# Patient Record
Sex: FEMALE | Race: WHITE | NOT HISPANIC OR LATINO | Employment: FULL TIME | URBAN - METROPOLITAN AREA
[De-identification: names, ages, dates, MRNs, and addresses within clinical notes are randomized per-mention and may not be internally consistent; named-entity substitution may affect disease eponyms.]

---

## 2017-11-29 ENCOUNTER — OFFICE VISIT (OUTPATIENT)
Dept: FAMILY MEDICINE | Facility: CLINIC | Age: 27
End: 2017-11-29
Payer: COMMERCIAL

## 2017-11-29 VITALS
WEIGHT: 152 LBS | RESPIRATION RATE: 18 BRPM | OXYGEN SATURATION: 99 % | HEIGHT: 66 IN | HEART RATE: 83 BPM | DIASTOLIC BLOOD PRESSURE: 83 MMHG | TEMPERATURE: 97.6 F | BODY MASS INDEX: 24.43 KG/M2 | SYSTOLIC BLOOD PRESSURE: 127 MMHG

## 2017-11-29 DIAGNOSIS — Z30.011 ENCOUNTER FOR INITIAL PRESCRIPTION OF CONTRACEPTIVE PILLS: Primary | ICD-10-CM

## 2017-11-29 DIAGNOSIS — Z23 NEED FOR TDAP VACCINATION: ICD-10-CM

## 2017-11-29 DIAGNOSIS — D23.5 BENIGN NEOPLASM OF SKIN OF TRUNK, EXCEPT SCROTUM: ICD-10-CM

## 2017-11-29 PROCEDURE — 90471 IMMUNIZATION ADMIN: CPT | Performed by: FAMILY MEDICINE

## 2017-11-29 PROCEDURE — 99203 OFFICE O/P NEW LOW 30 MIN: CPT | Mod: 25 | Performed by: FAMILY MEDICINE

## 2017-11-29 PROCEDURE — 90715 TDAP VACCINE 7 YRS/> IM: CPT | Performed by: FAMILY MEDICINE

## 2017-11-29 RX ORDER — NORETHINDRONE ACETATE AND ETHINYL ESTRADIOL .02; 1 MG/1; MG/1
1 TABLET ORAL DAILY
Qty: 84 TABLET | Refills: 4 | Status: SHIPPED | OUTPATIENT
Start: 2017-11-29 | End: 2019-05-09

## 2017-11-29 NOTE — MR AVS SNAPSHOT
After Visit Summary   11/29/2017    Christiane Hernandez    MRN: [id number]           Patient Information     Date Of Birth          1990        Visit Information        Provider Department      11/29/2017 11:20 AM Jill Coleman MD [facility]        Today's Diagnoses     Encounter for initial prescription of contraceptive pills    -  1    Benign neoplasm of skin of trunk, except scrotum        Need for Tdap vaccination          Care Instructions      Preventive Health Recommendations  Female Ages 26 - 39  Yearly exam:   See your health care provider every year in order to    Review health changes.     Discuss preventive care.      Review your medicines if you your doctor has prescribed any.    Until age 30: Get a Pap test every three years (more often if you have had an abnormal result).    After age 30: Talk to your doctor about whether you should have a Pap test every 3 years or have a Pap test with HPV screening every 5 years.   You do not need a Pap test if your uterus was removed (hysterectomy) and you have not had cancer.  You should be tested each year for STDs (sexually transmitted diseases), if you're at risk.   Talk to your provider about how often to have your cholesterol checked.  If you are at risk for diabetes, you should have a diabetes test (fasting glucose).  Shots: Get a flu shot each year. Get a tetanus shot every 10 years.   Nutrition:     Eat at least 5 servings of fruits and vegetables each day.    Eat whole-grain bread, whole-wheat pasta and brown rice instead of white grains and rice.    Talk to your provider about Calcium and Vitamin D.     Lifestyle    Exercise at least 150 minutes a week (30 minutes a day, 5 days of the week). This will help you control your weight and prevent disease.    Limit alcohol to one drink per day.    No smoking.     Wear sunscreen to prevent skin cancer.    See your dentist every six months for an exam and cleaning.          "   Follow-ups after your visit        Who to contact     If you have questions or need follow up information about today's clinic visit or your schedule please contact [facility] directly at [phone].  Normal or non-critical lab and imaging results will be communicated to you by MyChart, letter or phone within 4 business days after the clinic has received the results. If you do not hear from us within 7 days, please contact the clinic through MyChart or phone. If you have a critical or abnormal lab result, we will notify you by phone as soon as possible.  Submit refill requests through [employer] or call your pharmacy and they will forward the refill request to us. Please allow 3 business days for your refill to be completed.          Additional Information About Your Visit        [email or web address] Information     [employer] lets you send messages to your doctor, view your test results, renew your prescriptions, schedule appointments and more. To sign up, go to [email or web address] . Click on \"Log in\" on the left side of the screen, which will take you to the Welcome page. Then click on \"Sign up Now\" on the right side of the page.     You will be asked to enter the access code listed below, as well as some personal information. Please follow the directions to create your username and password.     Your access code is: [email or web address]  Expires: 2018 12:34 PM     Your access code will  in 90 days. If you need help or a new code, please call your [facility] clinic or [phone].        Care EveryWhere ID     This is your Care EveryWhere ID. This could be used by other organizations to access your [facility] medical records  [id number]        Your Vitals Were     Pulse Temperature Respirations Height Pulse Oximetry BMI (Body Mass Index)    83 97.6  F (36.4  C) (Oral) 18 5' 6.25\" (1.683 m) 99% 24.35 kg/m2       Blood Pressure from Last 3 Encounters:   17 127/83   11 106/56   11 102/62 "    Weight from Last 3 Encounters:   11/29/17 152 lb (68.9 kg)              We Performed the Following     ADMIN 1st VACCINE     TDAP VACCINE (ADACEL)          Today's Medication Changes          These changes are accurate as of: 11/29/17 12:34 PM.  If you have any questions, ask your nurse or doctor.               These medicines have changed or have updated prescriptions.        Dose/Directions    norethindrone-ethinyl estradiol 1-20 MG-MCG per tablet   Commonly known as:  MICROGESTIN   This may have changed:    - medication strength  - how much to take  - when to take this   Used for:  Encounter for initial prescription of contraceptive pills   Changed by:  Jill Coleman MD        Dose:  1 tablet   Take 1 tablet by mouth daily   Quantity:  84 tablet   Refills:  4            Where to get your medicines      These medications were sent to [id number] - [place], [place] - [address] AT [facility] [address]  [address], [place] [address]     Phone:  [phone]     norethindrone-ethinyl estradiol 1-20 MG-MCG per tablet                Primary Care Provider Fax #    Physician No Ref-Primary [phone]       No address on file        Equal Access to Services     [facility] : Hadii ayah blunto Somarco a, waaxda luqadaha, qaybta kaalmada adeegyada, steve lockett . So [facility] [phone].    ATENCIÓN: Si habla español, tiene a newsome disposición servicios gratuitos de asistencia lingüística. Vitor al [phone].    We comply with applicable federal civil rights laws and [place] laws. We do not discriminate on the basis of race, color, national origin, age, disability, sex, sexual orientation, or gender identity.            Thank you!     Thank you for choosing [facility]  for your care. Our goal is always to provide you with excellent care. Hearing back from our patients is one way we can continue to improve our services. Please take a few  minutes to complete the written survey that you may receive in the mail after your visit with us. Thank you!             Your Updated Medication List - Protect others around you: Learn how to safely use, store and throw away your medicines at www.disposemymeds.org.          This list is accurate as of: 11/29/17 12:34 PM.  Always use your most recent med list.                   Brand Name Dispense Instructions for use Diagnosis    LEXAPRO PO      Take  by mouth.    Dysuria       norethindrone-ethinyl estradiol 1-20 MG-MCG per tablet    MICROGESTIN    84 tablet    Take 1 tablet by mouth daily    Encounter for initial prescription of contraceptive pills       predniSONE 20 MG tablet    DELTASONE    10 tablet    Take 1 tablet by mouth 2 times daily.    Tonsillitis       SEASONIQUE PO      Take  by mouth.

## 2017-11-29 NOTE — PROGRESS NOTES
SUBJECTIVE:   CC: Christiane Hernandez is an 27 year old woman who presents for preventive health visit.     Physical   Annual:     Getting at least 3 servings of Calcium per day::  Yes    Bi-annual eye exam::  Yes    Dental care twice a year::  NO    Sleep apnea or symptoms of sleep apnea::  None    Diet::  Vegetarian/vegan    Frequency of exercise::  4-5 days/week    Duration of exercise::  45-60 minutes    Taking medications regularly::  Yes    Medication side effects::  Not applicable    Additional concerns today::  No    Christiane was scheduled for a physical, but she has actually already had a complete physical with her OB/GYN provider recently, so today we will have an office visit for the followin.  OCP refill: initially started as a teen for irregular periods.  She is currently sexually active, no new partners, no symptoms of STI and has had negative STI screening that is up to date.  No plans for pregnancy in the near future.  She does have a h/o anxiety, depression and possible bipolar disorder, but is doing very well.  She does not have migraines.      2.  Skin lesion: a mole on her back, has been there all her life, is not growing or changing to her knowledge, but just likes to have it checked.  Her father has had a skin cancer that was removed; not sure if it was a melanoma.   Her mole is not pruritic or painful.     3.  Need for Tdap.  Last was .    She has a history of depression, anxiety and possible bipolar (describes a period of time when she did not need sleep).  She also has PMDD, so it helps to take her OCPs continuously.  She states she has stopped all of her medications this past year and has felt the best she has felt in years.  She is not currently in therapy but has done counseling in the past.  She states her sleep is fairly good, sometimes wakes up at 3-4am and can't get back to sleep.  Normal appetite.    She has a h/o nephrolithiasis x2, last was 2-3 years ago.  Her pap smears have  been normal, and the last was done this year.  No family h/o early CAD, but her mother has HL, and the patient states that her cholesterol was checked by her ob/gyn this year and was kind of high.    She has her degree in social work.  She is currently working at the [employer] and is considering going back to school.    Today's PHQ-2 Score:   PHQ-2 ( 1999 Pfizer) 11/29/2017   Q1: Little interest or pleasure in doing things 1   Q2: Feeling down, depressed or hopeless 1   PHQ-2 Score 2   Q1: Little interest or pleasure in doing things Several days   Q2: Feeling down, depressed or hopeless Several days   PHQ-2 Score 2       Abuse: Current or Past(Physical, Sexual or Emotional)- No  Do you feel safe in your environment - Yes    Social History   Substance Use Topics     Smoking status: Never Smoker     Smokeless tobacco: Never Used     Alcohol use Yes      Comment: occasionally      The patient does not drink >3 drinks per day nor >7 drinks per week.    Reviewed orders with patient.  Reviewed health maintenance and updated orders accordingly -   Patient Active Problem List   Diagnosis     Mood disorder (H)     Irregular menses     PMDD (premenstrual dysphoric disorder)     History reviewed. No pertinent surgical history.    Social History   Substance Use Topics     Smoking status: Never Smoker     Smokeless tobacco: Never Used     Alcohol use Yes      Comment: occasionally      Family History   Problem Relation Age of Onset     Skin Cancer Father          Current Outpatient Prescriptions   Medication Sig Dispense Refill     norethindrone-ethinyl estradiol (MICROGESTIN) 1-20 MG-MCG per tablet Take 1 tablet by mouth daily 84 tablet 4     [DISCONTINUED] Norethindrone Acet-Ethinyl Est (MICROGESTIN 1/20 PO)        Levonorgest-Eth Estrad 91-Day (SEASONIQUE PO) Take  by mouth.       predniSONE (DELTASONE) 20 MG tablet Take 1 tablet by mouth 2 times daily. (Patient not taking: Reported on 11/29/2017) 10 tablet 0      "Escitalopram Oxalate (LEXAPRO PO) Take  by mouth.       Allergies   Allergen Reactions     Zithromax [Azithromycin]      Nausea and vomiting             Pertinent mammograms are reviewed under the imaging tab.  History of abnormal Pap smear:     Reviewed and updated as needed this visit by clinical staffTobacco  Allergies  Meds  Med Hx  Surg Hx  Fam Hx  Soc Hx        Reviewed and updated as needed this visit by Provider            Review of Systems  C: NEGATIVE for fever, chills, change in weight  I: NEGATIVE for worrisome rashes, moles or lesions  E: NEGATIVE for vision changes or irritation  ENT: NEGATIVE for ear, mouth and throat problems  R: NEGATIVE for significant cough or SOB  B: NEGATIVE for masses, tenderness or discharge  CV: NEGATIVE for chest pain, palpitations or peripheral edema  GI: NEGATIVE for nausea, abdominal pain  : NEGATIVE for unusual urinary or vaginal symptoms  M: NEGATIVE for significant arthralgias or myalgia  N: NEGATIVE for weakness, dizziness or paresthesias  P: NEGATIVE for changes in mood or affect     OBJECTIVE:   /83 (BP Location: Left arm, Patient Position: Sitting, Cuff Size: Adult Regular)  Pulse 83  Temp 97.6  F (36.4  C) (Oral)  Resp 18  Ht 5' 6.25\" (1.683 m)  Wt 152 lb (68.9 kg)  SpO2 99%  BMI 24.35 kg/m2  Physical Exam  GENERAL: healthy, alert and no distress  EYES: Eyes grossly normal to inspection, PERRL and conjunctivae and sclerae normal  HENT: ear canals and TM's normal, nose and mouth without ulcers or lesions  NECK: no adenopathy, no asymmetry, masses, or scars and thyroid normal to palpation  RESP: lungs clear to auscultation - no rales, rhonchi or wheezes  BREAST: deferred, had breast exam with OB/GYN  CV: regular rate and rhythm, normal S1 S2, no S3 or S4, no murmur, click or rub, no peripheral edema and peripheral pulses strong  MS: no gross musculoskeletal defects noted, no edema  SKIN: no suspicious lesions or rashes.  There is a raised nevus " "that is mostly flesh-toned, with a small speck of medium brown in the center, the lesion is about 4-5mm, round, with regular borders and symmetry.  It is located on her left upper back.  NEURO: Normal strength and tone, mentation intact and speech normal  PSYCH: mentation appears normal, affect normal/bright    ASSESSMENT/PLAN:   1. Encounter for initial prescription of contraceptive pills    - norethindrone-ethinyl estradiol (MICROGESTIN) 1-20 MG-MCG per tablet; Take 1 tablet by mouth daily  Dispense: 84 tablet; Refill: 4    2. Benign neoplasm of skin of trunk, except scrotum  Reassurance, benign appearance.  Monitor.    3. Need for Tdap vaccination    - TDAP VACCINE (ADACEL)  - ADMIN 1st VACCINE               reports that she has never smoked. She has never used smokeless tobacco.    Estimated body mass index is 24.35 kg/(m^2) as calculated from the following:    Height as of this encounter: 5' 6.25\" (1.683 m).    Weight as of this encounter: 152 lb (68.9 kg).       Counseling Resources:  ATP IV Guidelines  Pooled Cohorts Equation Calculator  Breast Cancer Risk Calculator  FRAX Risk Assessment  ICSI Preventive Guidelines  Dietary Guidelines for Americans, 2010  Wanova's MyPlate  ASA Prophylaxis  Lung CA Screening    Jill Coleman MD  [facility]  Answers for HPI/ROS submitted by the patient on 11/29/2017   PHQ-2 Score: 2    "

## 2017-11-29 NOTE — NURSING NOTE

## 2019-03-29 ENCOUNTER — VIRTUAL VISIT (OUTPATIENT)
Dept: FAMILY MEDICINE | Facility: OTHER | Age: 29
End: 2019-03-29

## 2019-03-29 NOTE — PROGRESS NOTES
"Date:   Clinician: Ck Merino  Clinician NPI: [id number]  Patient: Christiane Hernandez  Patient : 1990  Patient Address: [address] [place] [id number]  Patient Phone: [phone]  Visit Protocol: UTI  Patient Summary:  Christiane is a 28 year old ( : 1990 ) female who initiated a Visit for a presumed bladder infection. When asked the question \"Please sign me up to receive news, health information and promotions from [employer].\", Christiane responded \"No\".   Her symptoms started today and consist of feeling as if the bladder is never empty, urgency, foul-smelling urine, urinary incontinence, and urinary frequency.   Symptom details   Urine color: The color of her urine is yellow.    Denied symptoms include vomiting, abdominal pain, chills, vaginal itching, nausea, flank pain, vaginal discharge, and dysuria. She does not feel feverish.   Christiane has not used any over-the-counter medications or home remedies to relieve her current symptoms.  Precipitating events  Christiane denies having a sexually transmitted disease.  Pertinent medical history  Christiane has had a bladder infection before but has not had any in the past 12 months. Her current symptoms are similar to her previous bladder infection symptoms.   She is not sure what antibiotics have been effective in treating her past bladder infections.   She has a history of kidney stones. Her last episode was more than 6 months ago.   Christiane does not get yeast infections when she takes antibiotics and has not been prescribed antibiotics to prevent frequent or repeated bladder infections in the past. She has not experienced problems or side effects with any of the common antibiotics used to treat bladder infections.   She has not used a catheter or been a patient in a hospital or nursing home in the past 2 weeks.   Christiane does not smoke or use smokeless tobacco.   She denies pregnancy and denies breastfeeding. She is currently menstruating.   MEDICATIONS: No " current medications, ALLERGIES: NKDA  Clinician Response:  Dear Christiane,  Based on the information you have provided, you likely have an acute urinary tract infection, also called a bladder infection. Bladder infections occur when bacteria from the outside of the body enters the urinary tract. Any part of the urinary system can be infected, but the bladder is the most common.  Medication information  I am prescribing:     Nitrofurantoin monohyd/m-cryst (Macrobid) 100 mg oral capsule. Take 1 capsule by mouth every 12 hours for 5 days. Take this medication with food. There are no refills with this prescription.   The medication I prescribed for your bladder infection is an antibiotic. Continue taking the medication until it is gone even if you feel better.   Yeast infections can be a common side effect of antibiotics. The most common symptom of a yeast infection is itchiness in and around the vagina. Other signs and symptoms include burning, redness, or a thick, white vaginal discharge that looks like cottage cheese and does not have a bad smell.  Self care  Urination helps to flush bacteria from the urinary tract. For this reason, drinking water and urinating often helps relieve some symptoms of a bladder infection and can decrease your risk of getting bladder infections in the future.  Other steps you can take to prevent future bladder infections include:     Wipe front to back after using the bathroom    Urinate after sexual intercourse    Avoid using deodorant sprays, douches, or powders in the vaginal area     When to seek care  Please make an appointment to be seen in a clinic or urgent care if any of the following occur:     You develop new symptoms or your symptoms become worse    You have medication side effects that make it difficult to take them as prescribed    Your symptoms do not improve within 1-2 days of starting treatment    You have symptoms of a bladder infection that return shortly after completing  treatment     It is possible to have an allergic reaction to an antibiotic even if you have not had one in the past. If you notice a new rash, significant swelling, or difficulty breathing, stop taking this medication immediately and go to a clinic or urgent care.   Diagnosis: Acute uncomplicated bladder infection  Diagnosis ICD: N39.0  Prescription: nitrofurantoin monohyd/m-cryst (Macrobid) 100 mg oral capsule 10 capsule, 5 days supply. Take 1 capsule by mouth every 12 hours for 5 days. Refills: 0, Refill as needed: no, Allow substitutions: yes  Pharmacy: [id number] - [phone] - [address], [place] [address]

## 2019-05-03 DIAGNOSIS — Z30.011 ENCOUNTER FOR INITIAL PRESCRIPTION OF CONTRACEPTIVE PILLS: ICD-10-CM

## 2019-05-03 NOTE — LETTER
[facility]  [address] [address]  Phone: [phone]    05/06/19    Christiane David  [address]  [address]  [facility] [address]      To whom it may concern:     In order to ensure we are providing the best quality care, we have reviewed your chart and see that you are due for a follow up appointment with your healthcare provider.    For future medication refills, please contact your primary care clinic to schedule a follow up appointment. This can be requested via Stat or by calling the clinic at [phone].    We greatly appreciate the opportunity to serve you. Thank you for trusting us with your health care.      Sincerely,      Your care team at [facility]

## 2019-05-03 NOTE — TELEPHONE ENCOUNTER
"Requested Prescriptions   Pending Prescriptions Disp Refills     norethindrone-ethinyl estradiol (MICROGESTIN 1/20) 1-20 MG-MCG tablet [Pharmacy Med Name: NORETHINDRONE ACET/ETH 1/20 TB 21'S]  Last Written Prescription Date:  11-29-17  Last Fill Quantity: 84 tab,  # refills: 4   Last office visit: 11/29/2017 with prescribing provider:  Jill Coleman    Future Office Visit:    84 tablet 0     Sig: TAKE 1 TABLET BY MOUTH DAILY       Contraceptives Protocol Failed - 5/3/2019  8:48 AM        Failed - Recent (12 mo) or future (30 days) visit within the authorizing provider's specialty     Patient had office visit in the last 12 months or has a visit in the next 30 days with authorizing provider or within the authorizing provider's specialty.  See \"Patient Info\" tab in inbasket, or \"Choose Columns\" in Meds & Orders section of the refill encounter.          Passed - Patient is not a current smoker if age is 35 or older        Passed - Medication is active on med list        Passed - No active pregnancy on record        Passed - No positive pregnancy test in past 12 months         "

## 2019-05-03 NOTE — TELEPHONE ENCOUNTER
Routing refill request to provider for review/approval because:  Patient needs to be seen because it has been more than 1 year since last office visit.  Break in medication      Routing to [facility] Reception - please advise patient office visit

## 2019-05-06 RX ORDER — NORETHINDRONE ACETATE AND ETHINYL ESTRADIOL .02; 1 MG/1; MG/1
1 TABLET ORAL DAILY
Qty: 84 TABLET | Refills: 0 | OUTPATIENT
Start: 2019-05-06

## 2019-05-06 NOTE — TELEPHONE ENCOUNTER
LM to return clinic phone call. Patient is due for an office visit.   Sending letter.       Breonna JOHANSEN     [facility]

## 2019-05-09 ENCOUNTER — OFFICE VISIT (OUTPATIENT)
Dept: FAMILY MEDICINE | Facility: CLINIC | Age: 29
End: 2019-05-09
Payer: COMMERCIAL

## 2019-05-09 VITALS
OXYGEN SATURATION: 98 % | WEIGHT: 134.4 LBS | DIASTOLIC BLOOD PRESSURE: 68 MMHG | RESPIRATION RATE: 18 BRPM | BODY MASS INDEX: 21.09 KG/M2 | TEMPERATURE: 97.6 F | HEIGHT: 67 IN | SYSTOLIC BLOOD PRESSURE: 100 MMHG | HEART RATE: 75 BPM

## 2019-05-09 DIAGNOSIS — B00.1 COLD SORE: ICD-10-CM

## 2019-05-09 DIAGNOSIS — Z00.00 ENCOUNTER FOR ROUTINE ADULT HEALTH EXAMINATION WITHOUT ABNORMAL FINDINGS: Primary | ICD-10-CM

## 2019-05-09 DIAGNOSIS — Z30.011 ENCOUNTER FOR INITIAL PRESCRIPTION OF CONTRACEPTIVE PILLS: ICD-10-CM

## 2019-05-09 PROCEDURE — G0145 SCR C/V CYTO,THINLAYER,RESCR: HCPCS | Performed by: NURSE PRACTITIONER

## 2019-05-09 PROCEDURE — 99395 PREV VISIT EST AGE 18-39: CPT | Performed by: NURSE PRACTITIONER

## 2019-05-09 RX ORDER — VALACYCLOVIR HYDROCHLORIDE 1 G/1
2000 TABLET, FILM COATED ORAL 2 TIMES DAILY
Qty: 4 TABLET | Status: SHIPPED | OUTPATIENT
Start: 2019-05-09 | End: 2020-08-25

## 2019-05-09 RX ORDER — NORETHINDRONE ACETATE AND ETHINYL ESTRADIOL .02; 1 MG/1; MG/1
1 TABLET ORAL DAILY
Qty: 84 TABLET | Refills: 4 | Status: SHIPPED | OUTPATIENT
Start: 2019-05-09 | End: 2020-05-14

## 2019-05-09 ASSESSMENT — PATIENT HEALTH QUESTIONNAIRE - PHQ9: SUM OF ALL RESPONSES TO PHQ QUESTIONS 1-9: 2

## 2019-05-09 ASSESSMENT — MIFFLIN-ST. JEOR: SCORE: 1369.32

## 2019-05-09 NOTE — PROGRESS NOTES
SUBJECTIVE:   CC: Christiane Hernandez is an 28 year old woman who presents for preventive health visit.     Healthy Habits:    Do you get at least three servings of calcium containing foods daily (dairy, green leafy vegetables, etc.)? yes    Amount of exercise or daily activities, outside of work: 5 day(s) per week    Problems taking medications regularly No    Medication side effects: No    Have you had an eye exam in the past two years? yes    Do you see a dentist twice per year? no    Do you have sleep apnea, excessive snoring or daytime drowsiness?no    Pap smear done on this date: 11- (approximately), by this group: [facility], [place], [place], results were Normal.             Today's PHQ-2 Score:   PHQ-2 ( 1999 [employer]) 5/9/2019 11/29/2017   Q1: Little interest or pleasure in doing things 1 1   Q2: Feeling down, depressed or hopeless 1 1   PHQ-2 Score 2 2   Q1: Little interest or pleasure in doing things - Several days   Q2: Feeling down, depressed or hopeless - Several days   PHQ-2 Score - 2       Abuse: Current or Past(Physical, Sexual or Emotional)- No  Do you feel safe in your environment? Yes    Social History     Tobacco Use     Smoking status: Never Smoker     Smokeless tobacco: Never Used   Substance Use Topics     Alcohol use: Yes     Comment: occasionally      If you drink alcohol do you typically have >3 drinks per day or >7 drinks per week? No                     Reviewed orders with patient.  Reviewed health maintenance and updated orders accordingly - Yes          Pertinent mammograms are reviewed under the imaging tab.  History of abnormal Pap smear: NO - age 21-29 PAP every 3 years recommended     Reviewed and updated as needed this visit by clinical staff  Tobacco  Allergies  Meds  Med Hx  Surg Hx  Fam Hx  Soc Hx        Reviewed and updated as needed this visit by Provider            ROS:  CONSTITUTIONAL: NEGATIVE for fever, chills, change in weight  INTEGUMENTARU/SKIN: NEGATIVE for  "worrisome rashes, moles or lesions  EYES: NEGATIVE for vision changes or irritation  ENT: NEGATIVE for ear, mouth and throat problems  RESP: NEGATIVE for significant cough or SOB  BREAST: NEGATIVE for masses, tenderness or discharge  CV: NEGATIVE for chest pain, palpitations or peripheral edema  GI: NEGATIVE for nausea, abdominal pain, heartburn, or change in bowel habits  : NEGATIVE for unusual urinary or vaginal symptoms. Periods are regular.  MUSCULOSKELETAL: NEGATIVE for significant arthralgias or myalgia  NEURO: NEGATIVE for weakness, dizziness or paresthesias  PSYCHIATRIC: NEGATIVE for changes in mood or affect    OBJECTIVE:   /68 (BP Location: Left arm, Patient Position: Sitting, Cuff Size: Adult Regular)   Pulse 75   Temp 97.6  F (36.4  C) (Oral)   Resp 18   Ht 1.689 m (5' 6.5\")   Wt 61 kg (134 lb 6.4 oz)   LMP 05/01/2019 (Approximate)   SpO2 98%   Breastfeeding? No   BMI 21.37 kg/m    EXAM:  GENERAL: healthy, alert and no distress  EYES: Eyes grossly normal to inspection, PERRL and conjunctivae and sclerae normal  HENT: ear canals and TM's normal, nose and mouth without ulcers or lesions  NECK: no adenopathy, no asymmetry, masses, or scars and thyroid normal to palpation  RESP: lungs clear to auscultation - no rales, rhonchi or wheezes  BREAST: normal without masses, tenderness or nipple discharge and no palpable axillary masses or adenopathy  CV: regular rate and rhythm, normal S1 S2, no S3 or S4, no murmur, click or rub, no peripheral edema and peripheral pulses strong  ABDOMEN: soft, nontender, no hepatosplenomegaly, no masses and bowel sounds normal   (female): normal female external genitalia, normal urethral meatus, vaginal mucosa pink, moist, well rugated, and normal cervix/adnexa/uterus without masses or discharge  MS: no gross musculoskeletal defects noted, no edema  SKIN: no suspicious lesions or rashes  NEURO: Normal strength and tone, mentation intact and speech normal  PSYCH: " "mentation appears normal, affect normal/bright        ASSESSMENT/PLAN:   1. Encounter for routine adult health examination without abnormal findings    - Pap imaged thin layer screen reflex to HPV if ASCUS - recommend age 25 - 29    2. Encounter for initial prescription of contraceptive pills  Refills given.   - norethindrone-ethinyl estradiol (MICROGESTIN) 1-20 MG-MCG tablet; Take 1 tablet by mouth daily  Dispense: 84 tablet; Refill: 4    3. Cold sore    - valACYclovir (VALTREX) 1000 mg tablet; Take 2 tablets (2,000 mg) by mouth 2 times daily for 1 day  Dispense: 4 tablet; Refill: PRN    COUNSELING:   Reviewed preventive health counseling, as reflected in patient instructions    BP Readings from Last 1 Encounters:   05/09/19 100/68     Estimated body mass index is 21.37 kg/m  as calculated from the following:    Height as of this encounter: 1.689 m (5' 6.5\").    Weight as of this encounter: 61 kg (134 lb 6.4 oz).           reports that she has never smoked. She has never used smokeless tobacco.      Counseling Resources:  ATP IV Guidelines  Pooled Cohorts Equation Calculator  Breast Cancer Risk Calculator  FRAX Risk Assessment  ICSI Preventive Guidelines  Dietary Guidelines for Americans, 2010  USDA's MyPlate  ASA Prophylaxis  Lung CA Screening    Eliane Shaver NP  [facility]  "

## 2019-05-09 NOTE — Clinical Note
Pap smear done on this date: 11- (approximately), by this group: [facility], [facility], [place], results were Normal.  Please abstract the following data from this visit with this patient into the appropriate field in Epic:COLE was completed and records are coming from last clinic

## 2019-05-13 LAB
COPATH REPORT: NORMAL
PAP: NORMAL

## 2020-02-13 ENCOUNTER — OFFICE VISIT (OUTPATIENT)
Dept: FAMILY MEDICINE | Facility: CLINIC | Age: 30
End: 2020-02-13
Payer: COMMERCIAL

## 2020-02-13 VITALS
TEMPERATURE: 98.4 F | HEART RATE: 72 BPM | RESPIRATION RATE: 16 BRPM | DIASTOLIC BLOOD PRESSURE: 66 MMHG | WEIGHT: 145 LBS | SYSTOLIC BLOOD PRESSURE: 124 MMHG | BODY MASS INDEX: 23.05 KG/M2

## 2020-02-13 DIAGNOSIS — R07.0 THROAT PAIN: Primary | ICD-10-CM

## 2020-02-13 LAB
DEPRECATED S PYO AG THROAT QL EIA: NORMAL
SPECIMEN SOURCE: NORMAL

## 2020-02-13 PROCEDURE — 87880 STREP A ASSAY W/OPTIC: CPT | Performed by: FAMILY MEDICINE

## 2020-02-13 PROCEDURE — 99213 OFFICE O/P EST LOW 20 MIN: CPT | Performed by: FAMILY MEDICINE

## 2020-02-13 PROCEDURE — 87081 CULTURE SCREEN ONLY: CPT | Performed by: FAMILY MEDICINE

## 2020-02-13 ASSESSMENT — PAIN SCALES - GENERAL: PAINLEVEL: EXTREME PAIN (8)

## 2020-02-13 NOTE — PROGRESS NOTES
Subjective         HPI   Presents with one day history of cough, bodyaches, chills, HA and RIGHT ear pain and worsening ST today.  Works as RN- multiple sick exposures.  No fever.      Patient Active Problem List   Diagnosis     Irregular menses     PMDD (premenstrual dysphoric disorder)     History reviewed. No pertinent surgical history.    Social History     Tobacco Use     Smoking status: Never Smoker     Smokeless tobacco: Never Used   Substance Use Topics     Alcohol use: Yes     Comment: occasionally      Family History   Problem Relation Age of Onset     Skin Cancer Father          Current Outpatient Medications   Medication Sig Dispense Refill     norethindrone-ethinyl estradiol (MICROGESTIN) 1-20 MG-MCG tablet Take 1 tablet by mouth daily 84 tablet 4     valACYclovir (VALTREX) 1000 mg tablet Take 2 tablets (2,000 mg) by mouth 2 times daily for 1 day 4 tablet PRN     Allergies   Allergen Reactions     Zithromax [Azithromycin]      Nausea and vomiting     Recent Labs   Lab Test 11/15/16   A1C 5.2      HDL 42*   TRIG 114   ALT 12   CR 0.76   GFRESTIMATED 109   GFRESTBLACK 126   POTASSIUM 4.0   TSH 0.76      BP Readings from Last 3 Encounters:   02/13/20 124/66   05/09/19 100/68   11/29/17 127/83    Wt Readings from Last 3 Encounters:   02/13/20 65.8 kg (145 lb)   05/09/19 61 kg (134 lb 6.4 oz)   11/29/17 68.9 kg (152 lb)                    Reviewed and updated as needed this visit by Provider         Review of Systems   ROS COMP: Constitutional, HEENT, cardiovascular, pulmonary, GI, , musculoskeletal, neuro, skin, endocrine and psych systems are negative, except as otherwise noted.      Objective    /66   Pulse 72   Temp 98.4  F (36.9  C) (Tympanic)   Resp 16   Wt 65.8 kg (145 lb)   Breastfeeding No   BMI 23.05 kg/m      Physical Exam   GENERAL: healthy, alert and no distress  EYES: Eyes grossly normal to inspection, PERRL and conjunctivae and sclerae normal  HENT: ear canals and TM's  normal, nose and mouth without ulcers or lesions  NECK: no adenopathy, no asymmetry, masses, or scars and thyroid normal to palpation  RESP: lungs clear to auscultation - no rales, rhonchi or wheezes  CV: regular rate and rhythm, normal S1 S2, no S3 or S4, no murmur, click or rub, no peripheral edema and peripheral pulses strong  ABDOMEN: soft, nontender, no hepatosplenomegaly, no masses and bowel sounds normal  MS: no gross musculoskeletal defects noted, no edema  SKIN: no suspicious lesions or rashes  NEURO: Normal strength and tone, mentation intact and speech normal  PSYCH: mentation appears normal, affect normal/bright    Diagnostic Test Results:  Labs reviewed in Epic  Results for orders placed or performed in visit on 02/13/20   Strep, Rapid Screen     Status: None   Result Value Ref Range    Specimen Description Throat     Rapid Strep A Screen       NEGATIVE: No Group A streptococcal antigen detected by immunoassay, await culture report.   Beta strep group A culture     Status: None   Result Value Ref Range    Specimen Description Throat     Culture Micro No beta hemolytic Streptococcus Group A isolated            Assessment & Plan     1. Throat pain    - Strep, Rapid Screen  - Beta strep group A culture     Reassured RST is negative.  Might be early influenza-- recommend close Follow-up if no change or worsening symptoms prn.  Continue with supportive cares, including rest, fluids, NSAIDs/Tylenol prn fever/comfort.    Geraldine Cannon MD  [facility]

## 2020-02-14 LAB
BACTERIA SPEC CULT: NORMAL
SPECIMEN SOURCE: NORMAL

## 2020-03-01 ENCOUNTER — HEALTH MAINTENANCE LETTER (OUTPATIENT)
Age: 30
End: 2020-03-01

## 2020-05-14 DIAGNOSIS — Z30.011 ENCOUNTER FOR INITIAL PRESCRIPTION OF CONTRACEPTIVE PILLS: ICD-10-CM

## 2020-05-14 RX ORDER — NORETHINDRONE ACETATE AND ETHINYL ESTRADIOL .02; 1 MG/1; MG/1
TABLET ORAL
Qty: 84 TABLET | Refills: 1 | Status: SHIPPED | OUTPATIENT
Start: 2020-05-14 | End: 2020-08-25

## 2020-08-25 ENCOUNTER — OFFICE VISIT (OUTPATIENT)
Dept: FAMILY MEDICINE | Facility: CLINIC | Age: 30
End: 2020-08-25
Payer: COMMERCIAL

## 2020-08-25 VITALS
WEIGHT: 142 LBS | DIASTOLIC BLOOD PRESSURE: 78 MMHG | HEART RATE: 64 BPM | HEIGHT: 67 IN | TEMPERATURE: 98.4 F | RESPIRATION RATE: 16 BRPM | BODY MASS INDEX: 22.29 KG/M2 | OXYGEN SATURATION: 100 % | SYSTOLIC BLOOD PRESSURE: 112 MMHG

## 2020-08-25 DIAGNOSIS — Z00.00 ROUTINE GENERAL MEDICAL EXAMINATION AT A HEALTH CARE FACILITY: Primary | ICD-10-CM

## 2020-08-25 DIAGNOSIS — B00.1 COLD SORE: ICD-10-CM

## 2020-08-25 DIAGNOSIS — F41.9 ANXIETY: ICD-10-CM

## 2020-08-25 DIAGNOSIS — Z30.41 ENCOUNTER FOR SURVEILLANCE OF CONTRACEPTIVE PILLS: ICD-10-CM

## 2020-08-25 PROCEDURE — 99395 PREV VISIT EST AGE 18-39: CPT | Performed by: NURSE PRACTITIONER

## 2020-08-25 PROCEDURE — 99213 OFFICE O/P EST LOW 20 MIN: CPT | Mod: 25 | Performed by: NURSE PRACTITIONER

## 2020-08-25 RX ORDER — VALACYCLOVIR HYDROCHLORIDE 1 G/1
2000 TABLET, FILM COATED ORAL 2 TIMES DAILY
Qty: 4 TABLET | Status: SHIPPED | OUTPATIENT
Start: 2020-08-25 | End: 2022-04-12

## 2020-08-25 RX ORDER — NORETHINDRONE ACETATE AND ETHINYL ESTRADIOL .02; 1 MG/1; MG/1
1 TABLET ORAL DAILY
Qty: 112 TABLET | Status: SHIPPED | OUTPATIENT
Start: 2020-08-25 | End: 2021-08-29

## 2020-08-25 ASSESSMENT — ANXIETY QUESTIONNAIRES
2. NOT BEING ABLE TO STOP OR CONTROL WORRYING: MORE THAN HALF THE DAYS
1. FEELING NERVOUS, ANXIOUS, OR ON EDGE: NEARLY EVERY DAY
IF YOU CHECKED OFF ANY PROBLEMS ON THIS QUESTIONNAIRE, HOW DIFFICULT HAVE THESE PROBLEMS MADE IT FOR YOU TO DO YOUR WORK, TAKE CARE OF THINGS AT HOME, OR GET ALONG WITH OTHER PEOPLE: VERY DIFFICULT
6. BECOMING EASILY ANNOYED OR IRRITABLE: NEARLY EVERY DAY
GAD7 TOTAL SCORE: 14
7. FEELING AFRAID AS IF SOMETHING AWFUL MIGHT HAPPEN: SEVERAL DAYS
3. WORRYING TOO MUCH ABOUT DIFFERENT THINGS: MORE THAN HALF THE DAYS
5. BEING SO RESTLESS THAT IT IS HARD TO SIT STILL: SEVERAL DAYS

## 2020-08-25 ASSESSMENT — PATIENT HEALTH QUESTIONNAIRE - PHQ9
SUM OF ALL RESPONSES TO PHQ QUESTIONS 1-9: 7
5. POOR APPETITE OR OVEREATING: MORE THAN HALF THE DAYS

## 2020-08-25 ASSESSMENT — MIFFLIN-ST. JEOR: SCORE: 1398.8

## 2020-08-25 NOTE — PROGRESS NOTES
SUBJECTIVE:   CC: Christiane Hernandez is an 29 year old woman who presents for preventive health visit.     Healthy Habits:    Do you get at least three servings of calcium containing foods daily (dairy, green leafy vegetables, etc.)? Probably 3      Amount of exercise or daily activities, outside of work: 6 day(s) per week    Problems taking medications regularly No    Medication side effects: No    Have you had an eye exam in the past two years? no    Do you see a dentist twice per year? no    Do you have sleep apnea, excessive snoring or daytime drowsiness?no          Today's PHQ-2 Score:   PHQ-2 ( 1999 Pfizer) 5/9/2019 11/29/2017   Q1: Little interest or pleasure in doing things 1 1   Q2: Feeling down, depressed or hopeless 1 1   PHQ-2 Score 2 2   Q1: Little interest or pleasure in doing things - Several days   Q2: Feeling down, depressed or hopeless - Several days   PHQ-2 Score - 2       Abuse: Current or Past(Physical, Sexual or Emotional)- No  Do you feel safe in your environment? YES    Has been on antidepressant and anxiety medications in the past  Discuss things to help with this. Homeopathic options?   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  She has been on Vibriid, Lexapro and other SSRIs as well as a mood stabilizer in the past.  She either felt numb or had side effects.    Her anxiety typically presents as physical symptoms.      Social History     Tobacco Use     Smoking status: Never Smoker     Smokeless tobacco: Never Used   Substance Use Topics     Alcohol use: Yes     Comment: occasionally. less than 5 a week      If you drink alcohol do you typically have >3 drinks per day or >7 drinks per week? No                     Reviewed orders with patient.  Reviewed health maintenance and updated orders accordingly - Yes          Pertinent  "mammograms are reviewed under the imaging tab.  History of abnormal Pap smear: NO - age 21-29 PAP every 3 years recommended  PAP / HPV 5/9/2019   PAP NIL     Reviewed and updated as needed this visit by clinical staff  Tobacco  Allergies  Meds  Med Hx  Surg Hx  Fam Hx  Soc Hx        Reviewed and updated as needed this visit by Provider            ROS:  CONSTITUTIONAL: NEGATIVE for fever, chills, change in weight  INTEGUMENTARU/SKIN: NEGATIVE for worrisome rashes, moles or lesions  EYES: NEGATIVE for vision changes or irritation  ENT: NEGATIVE for ear, mouth and throat problems  RESP: NEGATIVE for significant cough or SOB  BREAST: NEGATIVE for masses, tenderness or discharge  CV: NEGATIVE for chest pain, palpitations or peripheral edema  GI: NEGATIVE for nausea, abdominal pain, heartburn, or change in bowel habits  : NEGATIVE for unusual urinary or vaginal symptoms. Periods are regular.  MUSCULOSKELETAL: NEGATIVE for significant arthralgias or myalgia  NEURO: NEGATIVE for weakness, dizziness or paresthesias  PSYCHIATRIC: see HPI    OBJECTIVE:   /78   Pulse 64   Temp 98.4  F (36.9  C) (Oral)   Resp 16   Ht 1.689 m (5' 6.5\")   Wt 64.4 kg (142 lb)   SpO2 100%   BMI 22.58 kg/m    EXAM:  GENERAL: healthy, alert and no distress  EYES: Eyes grossly normal to inspection, PERRL and conjunctivae and sclerae normal  HENT: ear canals and TM's normal, nose and mouth without ulcers or lesions  NECK: no adenopathy, no asymmetry, masses, or scars and thyroid normal to palpation  RESP: lungs clear to auscultation - no rales, rhonchi or wheezes  BREAST: normal without masses, tenderness or nipple discharge and no palpable axillary masses or adenopathy  CV: regular rate and rhythm, normal S1 S2, no S3 or S4, no murmur, click or rub, no peripheral edema and peripheral pulses strong  ABDOMEN: soft, nontender, no hepatosplenomegaly, no masses and bowel sounds normal  MS: no gross musculoskeletal defects noted, no " "edema  SKIN: no suspicious lesions or rashes  NEURO: Normal strength and tone, mentation intact and speech normal  PSYCH: mentation appears normal, affect normal        ASSESSMENT/PLAN:   (Z00.00) Routine general medical examination at a health care facility  (primary encounter diagnosis)  Comment:   Plan:     (F41.9) Anxiety  Comment:   Plan: Discussed her anxiety at length, including non-pharmacological treatments.  She will continue to see her therapist.  We did discuss the option of trying Buspar.  She prefers to wait and see how things go with starting school.  If her anxiety is not manageable, she will follow up for a virtual visit to discuss medication options in greater detail.     (B00.1) Cold sore  Comment:   Plan: valACYclovir (VALTREX) 1000 mg tablet        Refills given.     (Z30.41) Encounter for surveillance of contraceptive pills  Comment:   Plan: norethindrone-ethinyl estradiol (MICROGESTIN         1/20) 1-20 MG-MCG tablet        Refills given.       COUNSELING:   Reviewed preventive health counseling, as reflected in patient instructions    Estimated body mass index is 22.58 kg/m  as calculated from the following:    Height as of this encounter: 1.689 m (5' 6.5\").    Weight as of this encounter: 64.4 kg (142 lb).         reports that she has never smoked. She has never used smokeless tobacco.      Counseling Resources:  ATP IV Guidelines  Pooled Cohorts Equation Calculator  Breast Cancer Risk Calculator  FRAX Risk Assessment  ICSI Preventive Guidelines  Dietary Guidelines for Americans, 2010  USDA's MyPlate  ASA Prophylaxis  Lung CA Screening    Eliane Shaver NP  [facility]  "

## 2020-08-26 ASSESSMENT — ANXIETY QUESTIONNAIRES: GAD7 TOTAL SCORE: 14

## 2020-12-14 ENCOUNTER — HEALTH MAINTENANCE LETTER (OUTPATIENT)
Age: 30
End: 2020-12-14

## 2021-02-08 ENCOUNTER — OFFICE VISIT (OUTPATIENT)
Dept: FAMILY MEDICINE | Facility: CLINIC | Age: 31
End: 2021-02-08
Payer: COMMERCIAL

## 2021-02-08 VITALS
OXYGEN SATURATION: 100 % | HEART RATE: 64 BPM | BODY MASS INDEX: 24.01 KG/M2 | WEIGHT: 151 LBS | DIASTOLIC BLOOD PRESSURE: 75 MMHG | SYSTOLIC BLOOD PRESSURE: 120 MMHG | TEMPERATURE: 99 F | RESPIRATION RATE: 18 BRPM

## 2021-02-08 DIAGNOSIS — N20.0 KIDNEY STONE: ICD-10-CM

## 2021-02-08 DIAGNOSIS — R39.9 SYMPTOMS INVOLVING URINARY SYSTEM: Primary | ICD-10-CM

## 2021-02-08 LAB
ALBUMIN UR-MCNC: ABNORMAL MG/DL
APPEARANCE UR: CLEAR
BACTERIA #/AREA URNS HPF: ABNORMAL /HPF
BILIRUB UR QL STRIP: NEGATIVE
COLOR UR AUTO: YELLOW
GLUCOSE UR STRIP-MCNC: NEGATIVE MG/DL
HGB UR QL STRIP: NEGATIVE
KETONES UR STRIP-MCNC: NEGATIVE MG/DL
LEUKOCYTE ESTERASE UR QL STRIP: NEGATIVE
NITRATE UR QL: NEGATIVE
NON-SQ EPI CELLS #/AREA URNS LPF: ABNORMAL /LPF
PH UR STRIP: 7.5 PH (ref 5–7)
RBC #/AREA URNS AUTO: ABNORMAL /HPF
SOURCE: ABNORMAL
SP GR UR STRIP: 1.02 (ref 1–1.03)
UROBILINOGEN UR STRIP-ACNC: 0.2 EU/DL (ref 0.2–1)
WBC #/AREA URNS AUTO: ABNORMAL /HPF

## 2021-02-08 PROCEDURE — 87086 URINE CULTURE/COLONY COUNT: CPT | Performed by: NURSE PRACTITIONER

## 2021-02-08 PROCEDURE — 81001 URINALYSIS AUTO W/SCOPE: CPT | Performed by: NURSE PRACTITIONER

## 2021-02-08 PROCEDURE — 99213 OFFICE O/P EST LOW 20 MIN: CPT | Performed by: NURSE PRACTITIONER

## 2021-02-08 RX ORDER — SULFAMETHOXAZOLE/TRIMETHOPRIM 800-160 MG
1 TABLET ORAL 2 TIMES DAILY
Qty: 6 TABLET | Refills: 0 | Status: SHIPPED | OUTPATIENT
Start: 2021-02-08 | End: 2021-02-11

## 2021-02-08 RX ORDER — TAMSULOSIN HYDROCHLORIDE 0.4 MG/1
0.4 CAPSULE ORAL DAILY
Qty: 14 CAPSULE | Status: SHIPPED | OUTPATIENT
Start: 2021-02-08 | End: 2021-02-22

## 2021-02-08 NOTE — PROGRESS NOTES
"  Assessment & Plan     Symptoms involving urinary system  UA appears negative, but will treat with a 3 day course of Bactrim for possible UTI based on symptoms.  Try Azo, increase fluids.  Discussed getting a strainer to strain urine in future, may also take Flomax in case of symptoms of a renal stone in future.   - UA with Microscopic reflex to Culture  - Urine Culture Aerobic Bacterial  - sulfamethoxazole-trimethoprim (BACTRIM DS) 800-160 MG tablet; Take 1 tablet by mouth 2 times daily for 3 days    Follow up if she develops pain, vomiting, fevers or symptoms are not improving over the next few days.     Kidney stone  See above.   - tamsulosin (FLOMAX) 0.4 MG capsule; Take 1 capsule (0.4 mg) by mouth daily for 14 days      22 minutes spent on the date of the encounter doing chart review, patient visit and documentation                No follow-ups on file.    Eliane Shaver NP  [facility]    Herberth Grande is a 30 year old who presents to clinic today for the following health issues   HPI       Genitourinary - Female  Passed kidney stone Thurs AM (5 days ago)-concerned about UTI  Onset/Duration: In the past 2 days dysuria started   Description:   Painful urination (Dysuria): YES- burning.            Frequency: YES  Blood in urine (Hematuria): no  Delay in urine (Hesitency): YES: sometimes   Progression of Symptoms:  Worsening over the past 2 days.  Accompanying Signs & Symptoms:  Fever/chills: no  Flank pain: YES cramping on left side  Nausea and vomiting: YES- nausea   Vaginal symptoms: a little more  Odor than normal.   Abdominal/Pelvic Pain: cramping on left side at the end of urine flow   History:   History of frequent UTI s: YES  History of kidney stones: YES  Sexually Active: YES  Possibility of pregnancy: No  Therapies tried and outcome: ibuprofen 4 tablets every 6 hours: helped somewhat   Started taking [employer] \"In the mood\" before this started; stopped " taking this.  Tramadol on Wednesday night (her dad gave it to her).  Laying down with knees up feels better     She developed frequency and urgency last Wednesday in the middle of the night.  She then developed flank pain.  No relief from Tramadol, but then had sudden resolution when she stood.  This was her third kidney stone (last one was 2015).   She has not been able to capture any of them.    Since then, she has had dysuria and frequency.    She works at [employer], has not been drinking as much fluids.            Review of Systems         Objective    /75   Pulse 64   Temp 99  F (37.2  C) (Tympanic)   Resp 18   Wt 68.5 kg (151 lb)   SpO2 100%   BMI 24.01 kg/m    Body mass index is 24.01 kg/m .  Physical Exam   GENERAL: healthy, alert and no distress  NECK: no adenopathy, no asymmetry, masses, or scars and thyroid normal to palpation  RESP: lungs clear to auscultation - no rales, rhonchi or wheezes  CV: regular rate and rhythm, normal S1 S2, no S3 or S4, no murmur, click or rub, no peripheral edema and peripheral pulses strong  ABDOMEN: soft, nontender, no hepatosplenomegaly, no masses and bowel sounds normal  MS: no gross musculoskeletal defects noted, no edema  PSYCH: mentation appears normal, affect normal/bright    Results for orders placed or performed in visit on 02/08/21 (from the past 24 hour(s))   UA with Microscopic reflex to Culture    Specimen: Midstream Urine   Result Value Ref Range    Color Urine Yellow     Appearance Urine Clear     Glucose Urine Negative NEG^Negative mg/dL    Bilirubin Urine Negative NEG^Negative    Ketones Urine Negative NEG^Negative mg/dL    Specific Gravity Urine 1.025 1.003 - 1.035    pH Urine 7.5 (H) 5.0 - 7.0 pH    Protein Albumin Urine Trace (A) NEG^Negative mg/dL    Urobilinogen Urine 0.2 0.2 - 1.0 EU/dL    Nitrite Urine Negative NEG^Negative    Blood Urine Negative NEG^Negative    Leukocyte Esterase Urine Negative NEG^Negative    Source Midstream Urine      WBC Urine 0 - 5 OTO5^0 - 5 /HPF    RBC Urine O - 2 OTO2^O - 2 /HPF    Squamous Epithelial /LPF Urine Few FEW^Few /LPF    Bacteria Urine Few (A) NEG^Negative /HPF

## 2021-02-08 NOTE — PATIENT INSTRUCTIONS
Take Bactrim twice daily for 3 days.  Try Azo - can take up to three times a day as needed.    Drink 2 liters of fluid a day.

## 2021-02-09 LAB
BACTERIA SPEC CULT: NO GROWTH
Lab: NORMAL
SPECIMEN SOURCE: NORMAL

## 2021-07-12 ENCOUNTER — TRANSFERRED RECORDS (OUTPATIENT)
Dept: HEALTH INFORMATION MANAGEMENT | Facility: CLINIC | Age: 31
End: 2021-07-12
Payer: COMMERCIAL

## 2021-08-26 DIAGNOSIS — Z30.41 ENCOUNTER FOR SURVEILLANCE OF CONTRACEPTIVE PILLS: ICD-10-CM

## 2021-08-29 RX ORDER — NORETHINDRONE ACETATE AND ETHINYL ESTRADIOL .02; 1 MG/1; MG/1
TABLET ORAL
Qty: 84 TABLET | Refills: 1 | Status: SHIPPED | OUTPATIENT
Start: 2021-08-29 | End: 2022-03-14

## 2021-10-02 ENCOUNTER — HEALTH MAINTENANCE LETTER (OUTPATIENT)
Age: 31
End: 2021-10-02

## 2022-03-14 DIAGNOSIS — Z30.41 ENCOUNTER FOR SURVEILLANCE OF CONTRACEPTIVE PILLS: ICD-10-CM

## 2022-03-16 RX ORDER — NORETHINDRONE ACETATE AND ETHINYL ESTRADIOL .02; 1 MG/1; MG/1
1 TABLET ORAL DAILY
Qty: 84 TABLET | Refills: 0 | Status: SHIPPED | OUTPATIENT
Start: 2022-03-16 | End: 2022-04-12

## 2022-03-16 NOTE — TELEPHONE ENCOUNTER
---Prescription approved per [facility].       Anne Gaming RN BSN     "[employer]        --Last visit:  2/8/2021     --Future Visit: 4/12/22

## 2022-03-28 ENCOUNTER — TELEPHONE (OUTPATIENT)
Dept: FAMILY MEDICINE | Facility: CLINIC | Age: 32
End: 2022-03-28
Payer: COMMERCIAL

## 2022-03-28 NOTE — TELEPHONE ENCOUNTER
It was refilled the way it has always been written.  I did not write take continuously, so I assume just is taking it the normal way.

## 2022-03-28 NOTE — TELEPHONE ENCOUNTER
Pharmacy calling to get direction on her OCP, is she taking daily so she doesn't get a period?     Thank you

## 2022-03-29 NOTE — TELEPHONE ENCOUNTER
"[facility] pharmacy calling in now (it was transferred from [facility] to [facility]) asking if the script should be written as taking continuously.     RN read Eliane's message below stating \"I did not write take continuously\"    They will leave as is.     Maggie Salazar, GISELLAN RN  [facility]    "

## 2022-03-29 NOTE — TELEPHONE ENCOUNTER
Left message on answering machine for patient to call clinic triage.  We need to clarify how you are taking a med.    MARY Burden

## 2022-04-12 ENCOUNTER — OFFICE VISIT (OUTPATIENT)
Dept: FAMILY MEDICINE | Facility: CLINIC | Age: 32
End: 2022-04-12
Payer: COMMERCIAL

## 2022-04-12 VITALS
TEMPERATURE: 98.9 F | WEIGHT: 159 LBS | RESPIRATION RATE: 16 BRPM | SYSTOLIC BLOOD PRESSURE: 132 MMHG | HEART RATE: 65 BPM | BODY MASS INDEX: 24.96 KG/M2 | DIASTOLIC BLOOD PRESSURE: 78 MMHG | OXYGEN SATURATION: 99 % | HEIGHT: 67 IN

## 2022-04-12 DIAGNOSIS — F41.9 ANXIETY: ICD-10-CM

## 2022-04-12 DIAGNOSIS — Z12.4 CERVICAL CANCER SCREENING: ICD-10-CM

## 2022-04-12 DIAGNOSIS — Z30.41 ENCOUNTER FOR SURVEILLANCE OF CONTRACEPTIVE PILLS: ICD-10-CM

## 2022-04-12 DIAGNOSIS — Z11.4 SCREENING FOR HIV (HUMAN IMMUNODEFICIENCY VIRUS): ICD-10-CM

## 2022-04-12 DIAGNOSIS — F90.9 ATTENTION DEFICIT HYPERACTIVITY DISORDER (ADHD), UNSPECIFIED ADHD TYPE: ICD-10-CM

## 2022-04-12 DIAGNOSIS — Z00.00 ROUTINE GENERAL MEDICAL EXAMINATION AT A HEALTH CARE FACILITY: Primary | ICD-10-CM

## 2022-04-12 DIAGNOSIS — B00.1 COLD SORE: ICD-10-CM

## 2022-04-12 LAB
ERYTHROCYTE [DISTWIDTH] IN BLOOD BY AUTOMATED COUNT: 12.4 % (ref 10–15)
HCT VFR BLD AUTO: 40.7 % (ref 35–47)
HGB BLD-MCNC: 13.5 G/DL (ref 11.7–15.7)
MCH RBC QN AUTO: 30.5 PG (ref 26.5–33)
MCHC RBC AUTO-ENTMCNC: 33.2 G/DL (ref 31.5–36.5)
MCV RBC AUTO: 92 FL (ref 78–100)
PLATELET # BLD AUTO: 328 10E3/UL (ref 150–450)
RBC # BLD AUTO: 4.42 10E6/UL (ref 3.8–5.2)
WBC # BLD AUTO: 8 10E3/UL (ref 4–11)

## 2022-04-12 PROCEDURE — 82306 VITAMIN D 25 HYDROXY: CPT | Performed by: NURSE PRACTITIONER

## 2022-04-12 PROCEDURE — 80061 LIPID PANEL: CPT | Performed by: NURSE PRACTITIONER

## 2022-04-12 PROCEDURE — 85027 COMPLETE CBC AUTOMATED: CPT | Performed by: NURSE PRACTITIONER

## 2022-04-12 PROCEDURE — 99395 PREV VISIT EST AGE 18-39: CPT | Mod: 25 | Performed by: NURSE PRACTITIONER

## 2022-04-12 PROCEDURE — 96127 BRIEF EMOTIONAL/BEHAV ASSMT: CPT | Performed by: NURSE PRACTITIONER

## 2022-04-12 PROCEDURE — 87624 HPV HI-RISK TYP POOLED RSLT: CPT | Performed by: NURSE PRACTITIONER

## 2022-04-12 PROCEDURE — 36415 COLL VENOUS BLD VENIPUNCTURE: CPT | Performed by: NURSE PRACTITIONER

## 2022-04-12 PROCEDURE — G0145 SCR C/V CYTO,THINLAYER,RESCR: HCPCS | Performed by: NURSE PRACTITIONER

## 2022-04-12 PROCEDURE — 99213 OFFICE O/P EST LOW 20 MIN: CPT | Mod: 25 | Performed by: NURSE PRACTITIONER

## 2022-04-12 PROCEDURE — 87389 HIV-1 AG W/HIV-1&-2 AB AG IA: CPT | Performed by: NURSE PRACTITIONER

## 2022-04-12 PROCEDURE — 84443 ASSAY THYROID STIM HORMONE: CPT | Performed by: NURSE PRACTITIONER

## 2022-04-12 RX ORDER — VALACYCLOVIR HYDROCHLORIDE 1 G/1
TABLET, FILM COATED ORAL
Qty: 30 TABLET | Refills: 12 | Status: SHIPPED | OUTPATIENT
Start: 2022-04-12 | End: 2023-03-06

## 2022-04-12 RX ORDER — NORETHINDRONE ACETATE AND ETHINYL ESTRADIOL .02; 1 MG/1; MG/1
1 TABLET ORAL DAILY
Qty: 84 TABLET | Refills: 3 | Status: SHIPPED | OUTPATIENT
Start: 2022-04-12 | End: 2022-11-09

## 2022-04-12 RX ORDER — ESCITALOPRAM OXALATE 5 MG/1
TABLET ORAL
COMMUNITY
Start: 2022-03-13 | End: 2022-04-12

## 2022-04-12 ASSESSMENT — PATIENT HEALTH QUESTIONNAIRE - PHQ9
10. IF YOU CHECKED OFF ANY PROBLEMS, HOW DIFFICULT HAVE THESE PROBLEMS MADE IT FOR YOU TO DO YOUR WORK, TAKE CARE OF THINGS AT HOME, OR GET ALONG WITH OTHER PEOPLE: VERY DIFFICULT
SUM OF ALL RESPONSES TO PHQ QUESTIONS 1-9: 15
SUM OF ALL RESPONSES TO PHQ QUESTIONS 1-9: 15

## 2022-04-12 ASSESSMENT — ENCOUNTER SYMPTOMS
DIZZINESS: 0
DYSURIA: 0
NAUSEA: 0
PARESTHESIAS: 0
COUGH: 0
CHILLS: 0
HEARTBURN: 0
JOINT SWELLING: 0
HEADACHES: 0
CONSTIPATION: 0
HEMATURIA: 0
BREAST MASS: 0
ABDOMINAL PAIN: 0
MYALGIAS: 1
HEMATOCHEZIA: 0
NERVOUS/ANXIOUS: 1
DIARRHEA: 0
EYE PAIN: 0
FEVER: 0
FREQUENCY: 0
PALPITATIONS: 0
ARTHRALGIAS: 0
SORE THROAT: 0
SHORTNESS OF BREATH: 0
WEAKNESS: 0

## 2022-04-12 NOTE — PROGRESS NOTES
SUBJECTIVE:   CC: Christiane Hernandez is an 31 year old woman who presents for preventive health visit.   Patient has been advised of split billing requirements and indicates understanding: Yes  Healthy Habits:     Getting at least 3 servings of Calcium per day:  Yes    Bi-annual eye exam:  NO    Dental care twice a year:  NO    Sleep apnea or symptoms of sleep apnea:  None    Diet:  Vegetarian/vegan    Frequency of exercise:  6-7 days/week    Duration of exercise:  45-60 minutes    Taking medications regularly:  Yes    Medication side effects:  Not applicable    PHQ-2 Total Score: 2    Additional concerns today:  No  She did have neuropsychological testing done, diagnosis of ADHD, anxiety, depression and bipolar disorder.  She saw a psych NP, started on Lexapro, it did not help.  She was given Adderall, but did not take it, too nervous.  She is interested in checking labs that could relate to her mental health.     Today's PHQ-2 Score:   PHQ-2 ( 1999 Pf[employer]) 4/12/2022   Q1: Little interest or pleasure in doing things 2   Q2: Feeling down, depressed or hopeless 2   PHQ-2 Score 4   PHQ-2 Total Score (12-17 Years)- Positive if 3 or more points; Administer PHQ-A if positive -   Q1: Little interest or pleasure in doing things More than half the days   Q2: Feeling down, depressed or hopeless More than half the days   PHQ-2 Score 4     Answers for HPI/ROS submitted by the patient on 4/12/2022  If you checked off any problems, how difficult have these problems made it for you to do your work, take care of things at home, or get along with other people?: Very difficult  PHQ9 TOTAL SCORE: 15      Abuse: Current or Past (Physical, Sexual or Emotional) - No  Do you feel safe in your environment? Yes    Have you ever done Advance Care Planning? (For example, a Health Directive, POLST, or a discussion with a medical provider or your loved ones about your wishes): No, advance care planning information given to patient to review.   Patient plans to discuss their wishes with loved ones or provider.      Social History     Tobacco Use     Smoking status: Never Smoker     Smokeless tobacco: Never Used   Substance Use Topics     Alcohol use: Yes     Comment: occasionally. less than 5 a week      If you drink alcohol do you typically have >3 drinks per day or >7 drinks per week? No    Alcohol Use 4/12/2022   Prescreen: >3 drinks/day or >7 drinks/week? No   Prescreen: >3 drinks/day or >7 drinks/week? -       Reviewed orders with patient.  Reviewed health maintenance and updated orders accordingly - Yes      Breast Cancer Screening:    FHS-7:   Breast CA Risk Assessment (FHS-7) 4/12/2022   Did any of your first-degree relatives have breast or ovarian cancer? Yes   Did any of your relatives have bilateral breast cancer? Yes   Did any man in your family have breast cancer? Yes   Did any woman in your family have breast and ovarian cancer? Yes   Did any woman in your family have breast cancer before age 50 y? Yes   Do you have 2 or more relatives with breast and/or ovarian cancer? Yes   Do you have 2 or more relatives with breast and/or bowel cancer? Yes         Pertinent mammograms are reviewed under the imaging tab.    History of abnormal Pap smear: NO - age 30-65 PAP every 5 years with negative HPV co-testing recommended  PAP / HPV 5/9/2019   PAP (Historical) NIL     Reviewed and updated as needed this visit by clinical staff   Tobacco  Allergies  Meds   Med Hx  Surg Hx  Fam Hx  Soc Hx          Reviewed and updated as needed this visit by Provider                       Review of Systems   Constitutional: Negative for chills and fever.   HENT: Negative for congestion, ear pain, hearing loss and sore throat.    Eyes: Negative for pain and visual disturbance.   Respiratory: Negative for cough and shortness of breath.    Cardiovascular: Negative for chest pain, palpitations and peripheral edema.   Gastrointestinal: Negative for abdominal pain,  "constipation, diarrhea, heartburn, hematochezia and nausea.   Breasts:  Negative for tenderness, breast mass and discharge.   Genitourinary: Negative for dysuria, frequency, genital sores, hematuria, pelvic pain, urgency, vaginal bleeding and vaginal discharge.   Musculoskeletal: Positive for myalgias. Negative for arthralgias and joint swelling.   Skin: Negative for rash.   Neurological: Negative for dizziness, weakness, headaches and paresthesias.   Psychiatric/Behavioral: Positive for mood changes. The patient is nervous/anxious.           OBJECTIVE:   /78   Pulse 65   Temp 98.9  F (37.2  C) (Temporal)   Resp 16   Ht 1.689 m (5' 6.5\")   Wt 72.1 kg (159 lb)   LMP  (LMP Unknown)   SpO2 99%   Breastfeeding No   BMI 25.28 kg/m    Physical Exam  GENERAL: healthy, alert and no distress  EYES: Eyes grossly normal to inspection, PERRL and conjunctivae and sclerae normal  HENT: ear canals and TM's normal, nose and mouth without ulcers or lesions  NECK: no adenopathy, no asymmetry, masses, or scars and thyroid normal to palpation  RESP: lungs clear to auscultation - no rales, rhonchi or wheezes  BREAST: normal without masses, tenderness or nipple discharge and no palpable axillary masses or adenopathy  CV: regular rate and rhythm, normal S1 S2, no S3 or S4, no murmur, click or rub, no peripheral edema and peripheral pulses strong  ABDOMEN: soft, nontender, no hepatosplenomegaly, no masses and bowel sounds normal   (female): normal female external genitalia, normal urethral meatus, vaginal mucosa pink, moist, well rugated, and normal cervix/adnexa/uterus without masses or discharge  MS: no gross musculoskeletal defects noted, no edema  SKIN: no suspicious lesions or rashes  NEURO: Normal strength and tone, mentation intact and speech normal  PSYCH: mentation appears normal, affect normal/bright        ASSESSMENT/PLAN:   (Z00.00) Routine general medical examination at a health care facility  (primary " "encounter diagnosis)  Comment:   Plan: Lipid panel reflex to direct LDL Non-fasting,         TSH with free T4 reflex, Vitamin D Deficiency,         CBC with platelets            (Z11.4) Screening for HIV (human immunodeficiency virus)  Comment:   Plan: HIV Antigen Antibody Combo            (Z12.4) Cervical cancer screening  Comment:   Plan: Pap Screen with HPV - recommended age 30 - 65         years            (Z30.41) Encounter for surveillance of contraceptive pills  Comment:   Plan: norethindrone-ethinyl estradiol (MICROGESTIN         1/20) 1-20 MG-MCG tablet        Refills given.     (B00.1) Cold sore  Comment:   Plan: valACYclovir (VALTREX) 1000 mg tablet        Refills given.     (F41.9) Anxiety  Comment:   Plan: Adult Mental Health  Referral        Will check CBC, vitamin D, TSH.  Referral to collaborative psychiatry given.     (F90.9) Attention deficit hyperactivity disorder (ADHD), unspecified ADHD type  Comment:   Plan: Adult Mental Health  Referral        See above.           COUNSELING:  Reviewed preventive health counseling, as reflected in patient instructions    Estimated body mass index is 25.28 kg/m  as calculated from the following:    Height as of this encounter: 1.689 m (5' 6.5\").    Weight as of this encounter: 72.1 kg (159 lb).    Weight management plan: Discussed healthy diet and exercise guidelines    She reports that she has never smoked. She has never used smokeless tobacco.      Counseling Resources:  ATP IV Guidelines  Pooled Cohorts Equation Calculator  Breast Cancer Risk Calculator  BRCA-Related Cancer Risk Assessment: FHS-7 Tool  FRAX Risk Assessment  ICSI Preventive Guidelines  Dietary Guidelines for Americans, 2010  USDA's MyPlate  ASA Prophylaxis  Lung CA Screening    Eliane Shaver NP  [facility]  "

## 2022-04-13 LAB
CHOLEST SERPL-MCNC: 152 MG/DL
DEPRECATED CALCIDIOL+CALCIFEROL SERPL-MC: 65 UG/L (ref 20–75)
FASTING STATUS PATIENT QL REPORTED: NO
HDLC SERPL-MCNC: 60 MG/DL
HIV 1+2 AB+HIV1 P24 AG SERPL QL IA: NONREACTIVE
LDLC SERPL CALC-MCNC: 79 MG/DL
NONHDLC SERPL-MCNC: 92 MG/DL
TRIGL SERPL-MCNC: 67 MG/DL
TSH SERPL DL<=0.005 MIU/L-ACNC: 1.7 MU/L (ref 0.4–4)

## 2022-04-15 LAB
BKR LAB AP GYN ADEQUACY: NORMAL
BKR LAB AP GYN INTERPRETATION: NORMAL
BKR LAB AP HPV REFLEX: NORMAL
BKR LAB AP PREVIOUS ABNORMAL: NORMAL
PATH REPORT.COMMENTS IMP SPEC: NORMAL
PATH REPORT.COMMENTS IMP SPEC: NORMAL
PATH REPORT.RELEVANT HX SPEC: NORMAL

## 2022-04-19 LAB
HUMAN PAPILLOMA VIRUS 16 DNA: NEGATIVE
HUMAN PAPILLOMA VIRUS 18 DNA: NEGATIVE
HUMAN PAPILLOMA VIRUS FINAL DIAGNOSIS: NORMAL
HUMAN PAPILLOMA VIRUS OTHER HR: NEGATIVE

## 2022-09-03 ENCOUNTER — HEALTH MAINTENANCE LETTER (OUTPATIENT)
Age: 32
End: 2022-09-03

## 2022-10-12 ASSESSMENT — ANXIETY QUESTIONNAIRES
8. IF YOU CHECKED OFF ANY PROBLEMS, HOW DIFFICULT HAVE THESE MADE IT FOR YOU TO DO YOUR WORK, TAKE CARE OF THINGS AT HOME, OR GET ALONG WITH OTHER PEOPLE?: SOMEWHAT DIFFICULT
3. WORRYING TOO MUCH ABOUT DIFFERENT THINGS: NOT AT ALL
6. BECOMING EASILY ANNOYED OR IRRITABLE: SEVERAL DAYS
4. TROUBLE RELAXING: SEVERAL DAYS
4. TROUBLE RELAXING: SEVERAL DAYS
7. FEELING AFRAID AS IF SOMETHING AWFUL MIGHT HAPPEN: NOT AT ALL
IF YOU CHECKED OFF ANY PROBLEMS ON THIS QUESTIONNAIRE, HOW DIFFICULT HAVE THESE PROBLEMS MADE IT FOR YOU TO DO YOUR WORK, TAKE CARE OF THINGS AT HOME, OR GET ALONG WITH OTHER PEOPLE: SOMEWHAT DIFFICULT
GAD7 TOTAL SCORE: 4
5. BEING SO RESTLESS THAT IT IS HARD TO SIT STILL: SEVERAL DAYS
7. FEELING AFRAID AS IF SOMETHING AWFUL MIGHT HAPPEN: NOT AT ALL
5. BEING SO RESTLESS THAT IT IS HARD TO SIT STILL: SEVERAL DAYS
7. FEELING AFRAID AS IF SOMETHING AWFUL MIGHT HAPPEN: NOT AT ALL
GAD7 TOTAL SCORE: 4
6. BECOMING EASILY ANNOYED OR IRRITABLE: SEVERAL DAYS
GAD7 TOTAL SCORE: 4
1. FEELING NERVOUS, ANXIOUS, OR ON EDGE: SEVERAL DAYS
GAD7 TOTAL SCORE: 4
2. NOT BEING ABLE TO STOP OR CONTROL WORRYING: NOT AT ALL
8. IF YOU CHECKED OFF ANY PROBLEMS, HOW DIFFICULT HAVE THESE MADE IT FOR YOU TO DO YOUR WORK, TAKE CARE OF THINGS AT HOME, OR GET ALONG WITH OTHER PEOPLE?: SOMEWHAT DIFFICULT
1. FEELING NERVOUS, ANXIOUS, OR ON EDGE: SEVERAL DAYS
GAD7 TOTAL SCORE: 4
7. FEELING AFRAID AS IF SOMETHING AWFUL MIGHT HAPPEN: NOT AT ALL
3. WORRYING TOO MUCH ABOUT DIFFERENT THINGS: NOT AT ALL
2. NOT BEING ABLE TO STOP OR CONTROL WORRYING: NOT AT ALL
IF YOU CHECKED OFF ANY PROBLEMS ON THIS QUESTIONNAIRE, HOW DIFFICULT HAVE THESE PROBLEMS MADE IT FOR YOU TO DO YOUR WORK, TAKE CARE OF THINGS AT HOME, OR GET ALONG WITH OTHER PEOPLE: SOMEWHAT DIFFICULT
GAD7 TOTAL SCORE: 4

## 2022-10-12 ASSESSMENT — PATIENT HEALTH QUESTIONNAIRE - PHQ9
10. IF YOU CHECKED OFF ANY PROBLEMS, HOW DIFFICULT HAVE THESE PROBLEMS MADE IT FOR YOU TO DO YOUR WORK, TAKE CARE OF THINGS AT HOME, OR GET ALONG WITH OTHER PEOPLE: SOMEWHAT DIFFICULT
SUM OF ALL RESPONSES TO PHQ QUESTIONS 1-9: 9
10. IF YOU CHECKED OFF ANY PROBLEMS, HOW DIFFICULT HAVE THESE PROBLEMS MADE IT FOR YOU TO DO YOUR WORK, TAKE CARE OF THINGS AT HOME, OR GET ALONG WITH OTHER PEOPLE: SOMEWHAT DIFFICULT
SUM OF ALL RESPONSES TO PHQ QUESTIONS 1-9: 9

## 2022-10-12 NOTE — PROGRESS NOTES
DIAGNOSTIC PSYCHIATRIC ASSESSMENT     Name:  Christiane Hernandez  : 1990     Telemedicine Visit: The patient's condition can be safely assessed and treated via synchronous audio/visual telemedicine encounter.    Consent:  The patient/guardian has verbally consented to: the potential risks and benefits of telemedicine (video or phone) versus in-person care; bill insurance or make self-payment for services provided; and responsibility for payment of non-covered services.     Reason for Telemedicine Visit: COVID 19 pandemic and the social and physical recommendations by the CDC and MD.      Originating Site (Patient Location): Patient's home; pt verified their location for the duration of this appointment as address on record.    Distant Site (Provider Location): Provider Remote Setting    Mode of Communication:  Tablo video platform     As the treating provider, I attest to compliance with applicable laws and regulations related to telemedicine.  Chart documentation may have been completed with Dragon Voice Recognition software.     IDENTIFICATION   Patient is a 32 year old year old White, female  who presents for intake and medication management with [facility].  Patient was referred by PCP.   Patient attended the session alone.     Patient care team: Patient Care Team:  No Ref-Primary, Physician as PCP - Eliane Glynn NP as Assigned PCP    Available records in [facility] and/or Care Everywhere were reviewed today.     LANGUAGE OR COMMUNICATION BARRIERS   Are there language or communication issues or need for modification in treatment? No   Are there ethnic, cultural or Voodoo factors that may be relevant for therapy? No  Client identified their preferred language to be fluent English in conversational context  Does the client need the assistance of an  or other support involved in therapy? No                                                 CHIEF COMPLAINT   Patient is a 32 year old,  White,  "female who presents for initial psychiatric evaluation with the [facility] ([facility]) for evaluation of depression, anxiety, possible bipolar disorder and attentional problems.      HISTORY OF PRESENT ILLNESS     Per [facility] Emily Maddox during today's team-based visit:   \"The reason for seeking services at this time is: \"ADHD\".  The problem(s) began 06/06/21.  Last summer went through testing for ADHD with a  from [facility]. (Media report dated 04/26/2022). Had been working in a serving job for 10 years and felt that the constant movement and nature of work did not present as an issue but then went to graduate school and noticed a lot of focus and attention issues. Did complete the testing and not sure about what want to do with the diagnosis. Wary of medications as has been taking meds for anxiety and depression since teenager. Has learned skills to manage symptoms. Told ADHD, Inattentive and Bipolar I.  Hx of self-esteem issues and comparing to others. Not have friends that are same age. Impulsive with spending, shopping. King issues with family.  Did see a NP through [facility] and restarted on Lexapro 6-9 months ago and took for a month and not help so stopped. Negative side effects. Had taken before. Did get a prescription for Adderall but did not fill due to skeptism that it would work.  SH: denies current or past  SI: Thoughts that feel stuck and nothing can be done to get out of negative feeling. Temporary but intense feeling. Negative self-talk. Passive thoughts but not want life to be over. \"Not be here anymore.\" No active thoughts. Not get to the point of a plan or intent. Did have an impuslive overdose at age 18 and told mother and went to the ED. No inpatient admissions. Discussed crisis resources.  Sleep: feels physically restless, has increased exercise. Average 6-7 hours. Feels tired but no naps. Vivid dreams but no nightmares. " "Does feel uncomfortable afterward.  Appetite: normal  Tx: [facility]ique [facility]no at [facility]. Has been seeing for 4+ years and now down to every three weeks. Working on relationship needs and self-worth. Able to address situational needs.\"    ---Psychiatry Evaluation---    [facility]. She agrees with the assessment above.   She is finishing up her graduate program in about 2 weeks. She sought out psych testing when she first started her graduate program but completed it in 2021. Pt trails off because she says she forgets the original question. Pt was dx with BAD and ADHD in 2021 following that psych testing.  She does endorse anxiety in general and panic/performance anxiety with presentations at school. She also describes problems expressing her thoughts because thy get \"jumbled.\" She is being told this may be due to ADHD and anxiety and not just feeling self-conscious. She describes avoidance as her primary coping mechanism for \"most things.\" She wants to make social plans but will often cancel last minute. She would cancel due to feeling anxious/nervous about the upcoming encounters. Mostly physical GI sx/nervousness. This resulted in losing many friendships. She identifies that she feels ok during social events/gatherings. Pt endorses GI sx r/t anxiety including diarrhea, nervous stomach.     Pt is currently not taking any medication. She previously tried escitalopram and noticed emotional numbing and decreased libido.   She has not noticed any antidepressant induced payton or hypomania. She says her hypomanic or manic episodes occur quarterly and last a day. She will wake up feeling really great and describes feeling elevated for 5-8 hours where she is \"a little more impulsive\". She ideates about projects, starts but doesn't finish projects reaches out to people to make plans. She denies problems with staying asleep on these days. \"I would never stay up moving past 10 oclock at " "night.\" She describes having low libido at baseline and cannot recall that her libido is increased during these days of feeling good. She generally notices depressive day after the good day. This typically lasts a day or two.     PSYCHIATRIC REVIEW OF SYSTEMS:     Depression:  Excessive or inappropriate guilt, Change in energy level, Difficulties concentrating, Suicidal ideation, Feelings of hopelessness, Feelings of helplessness, Low self-worth, Ruminations, Irritability, Feeling sad, down, or depressed, Withdrawn and Anger outbursts  PHQ9 score is 9 indicating mild depression.   Suicidal ideation:  fleeting morbid ideation on and off; denies SI  Mood lability:  No current symptoms; Elevated mood, Racing thoughts, Increased activity, Restlessness, Distractibility and Impulsiveness little more than normal and does notice it- expects the next day to be less energy  Psychosis: Denies thought disturbance symptoms or hx of AH, VH, TH, or OH. and Denies having periods of feeling others were plotting to harm them, people reading their mind, reading others mind, receiving special messages from TV, computer, etc.   Anxiety: Social anxiety, Psychomotor agitation and feels more physically- shaking, heart rate increases, sweating, cold and shivery, gets more fidgety. Anxiety overall has greatly improved.  GAD7 score is 4 indicating minimal anxiety  Panic: Denies history of panic attacks.   Social anxiety: endorses avoiding social events due to nervousness. Has lost relationships as a result.   PTSD: Denies experiencing or witnessing an event considered traumatic.    Trauma history: Denies  Eating Disorder: Denies concerns with weight or body image beyond normal concern.  Denies restricting or purging behaviors or excessive exercise for weight control.  ADD / ADHD: Denies previous dx of ADHD prior to age 13.   Reports previously diagnosed 2021 following psych testing.  Inattentive, Poor task completion, Poor organizational " "skills, Distractibility, Forgetful, Interrupts, Impulsive, Restlessness/fidgety, Hyperverbal, Hyperactive and not able to carry thoughts, communication issues, thoughts jumble  Autism symptoms:  None  OCD: Denies hx of obsessions or compulsions irresistible urges to do things repeatedly such as counting, washing hands, checking, etc. Denies hoarding.  No current symptoms                SUBSTANCE USE HISTORY    Tobacco use: Denies  Caffeine:  Yes  2-3 cups/day of tea  Current alcohol:  3 drinks 2x mo  Current substance use: cannabis gummies 2x mo  Past use alcohol/substance use: experiemented with hallucinogens July 2022    PSYCHIATRIC HISTORY:   Past psychiatric diagnoses:   ABIDA dx following psych eval July 2021  Depression  ADHD, inattentive dx following psych eval July 2021  Bipolar disorder dx following psych eval July 2021    Past psychiatric history and treatment:  Previous psychiatry: saw APR[facility]IL[facility] a few times at [facility] (closed); hx psychiatry in the past  Previous therapist: seeing therapist now x 4 years  History of Psychiatric Hospitalizations:   - Inpatient: denies  - IOP/PHP/Day treatment: denies  History of Suicidal Ideation: morbid ideation on and off  History of Suicide Attempts:  Overdose in HS on pills    History of Self-injurious Behavior: denies  History of impulsivity: Yes shopping during elevated mood and periods of depressed mood; impulsively overdosed   History of Violence/Aggression: No   History of Commitment? No   Electroconvulsive Therapy (ECT) or Transcranial Magnetic Stimulation (TMS): No   PharmacogenomicTesting (such as GeneSight): No     SOCIAL HISTORY                                         Per [facility] intake: \"Patient reported they grew up in [place], [place].  They were raised by biological parents.  Parents were always together.  She has a younger brother. Patient reported that their childhood was \"good, fine.\" Being the oldest learned to be controlling and taking care of others.  Patient " described their current relationships with family of origin as: some co-dependency and working on boundaries.   The patient describes their cultural background as .  Cultural influences and impact on patient's life structure, values, norms, and healthcare: [place] Not Episcopal.  Contextual influences on patient's health include: Individual Factors anxiety with talking to others and appointments.    These factors will be addressed in the Preliminary Treatment plan. Patient identified their preferred language to be English. Patient reported they does not need the assistance of an  or other support involved in therapy.   Patient reported had no significant delays in developmental tasks.   Patient's highest education level was graduate school.  Patient identified the following learning problems: attention, concentration and procrastination, math.  Got good grades- did bare minimum. Modifications will not be used to assist communication in therapy. Patient reports they are  able to understand written materials.  Patient reported the following relationship history: started dating at age 18.  Patient's current relationship status is has a partner or significant other for 7 years.  She was  previously for one year and then  finalized 1.5 years after relationship ended. Did not work out.  Patient identified their sexual orientation as heterosexual.  Patient reported having zero child(kevin). Patient identified partner; parents; siblings; pets; therapist as part of their support system.  Patient identified the quality of these relationships as fair.    Patient's current living/housing situation involves staying in own home/apartment.  The immediate members of family and household include Katt Lynch Fiance and they report that housing is stable.  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 Overwhelmed and issues with focus. Patient reports  "their finances are obtained through employment; partner. Patient does identify finances as a current stressor.    Patient reported that they have not been involved with the legal system.   Patient does not report being under probation/ parole/ jurisdiction.\"    MEDICATIONS                                                                                              Current medications reviewed today and are noted below.   Current psychotropic medications:   None    Past psychotropic medications:  Escitalopram - intolerable SEs  Bupropion  Vilazodone - worked well for anx/dep. Didn't feel emotionally numb.    Supplements:   See below      Reviewed, no recent rx in the past year    Current Outpatient Medications   Medication Sig     IBUPROFEN PO Take by mouth as needed for moderate pain     Multiple Vitamin (MULTI-VITAMIN DAILY PO) Take by mouth daily     norethindrone-ethinyl estradiol (MICROGESTIN 1/20) 1-20 MG-MCG tablet Take 1 tablet by mouth daily     valACYclovir (VALTREX) 1000 mg tablet Take 2 tablets twice daily as needed for cold sores     No current facility-administered medications for this visit.        VITALS   There were no vitals taken for this visit.    Pulse Readings from Last 5 Encounters:   04/12/22 65   02/08/21 64   08/25/20 64   02/13/20 72   05/09/19 75     Wt Readings from Last 5 Encounters:   04/12/22 72.1 kg (159 lb)   02/08/21 68.5 kg (151 lb)   08/25/20 64.4 kg (142 lb)   02/13/20 65.8 kg (145 lb)   05/09/19 61 kg (134 lb 6.4 oz)     BP Readings from Last 5 Encounters:   04/12/22 132/78   02/08/21 120/75   08/25/20 112/78   02/13/20 124/66   05/09/19 100/68       LABS & IMAGING                                                                                                                Recent available labs reviewed today.    Recent Labs   Lab Test 11/15/16  0000   CR 0.76   GFRESTIMATED 109     Recent Labs   Lab Test 11/15/16  0000   AST 13   ALT 12     Recent Labs   Lab Test " 04/12/22  1643 11/15/16  0000   TSH 1.70 0.76     ECG none on file    ALLERGY & IMMUNIZATIONS       Allergies   Allergen Reactions     Zithromax [Azithromycin]      Nausea and vomiting       MEDICAL & SURGICAL HISTORY    Reviewed past medical and surgical history today.   Pregnant - denies.   Hx seizures or head injuries - No    Past Medical History:   Diagnosis Date     Irregular menses      Kidney stones      Mood disorder (H)     anxiety, depression, possible BAD     PMDD (premenstrual dysphoric disorder)        FAMILY MEDICAL AND PSYCHIATRIC HISTORY     Family History   Problem Relation Age of Onset     Colitis Mother      Skin Cancer Father      Atrial fibrillation Father      Sleep Apnea Father      Breast Cancer Maternal Grandmother      Breast Cancer Maternal Grandfather      Dementia Maternal Grandfather      Breast Cancer Maternal Aunt        Family history of sudden or unexplained death or an event requiring resuscitation in children or young adults, cardiac arrhythmias (eg, Luis-Parkinson-White syndrome), long QT syndrome, catecholaminergic paroxysmal ventricular tachycardia, Brugada syndrome, arrhythmogenic right ventricular dysplasia, hypertrophic cardiomyopathy, dilated cardiomyopathy, or Marfan syndrome?  No    Family psychiatric history: cousin with ADHD and depression, 2 cousins with right ear, 1 cousin with BPD and BAD  Family substance use history:  ETOH problems on paternal side  Family suicide history: No  Medications family responded to: Denies     MEDICAL REVIEW OF SYSTEMS:   10 systems (general, cardiovascular, respiratory, eyes, ENT, endocrine, GI, , M/S, neurological) were reviewed. Most pertinent finding(s) is/are: denies. The remaining systems are all unremarkable.    MENTAL STATUS EXAM:     Alertness: alert  and oriented  Appearance: adequately groomed  Behavior/Demeanor: cooperative, pleasant and calm, with good eye contact   Speech: normal and regular rate and rhythm  Language:  intact and no problems  Psychomotor: normal or unremarkable  Mood: anxious and worried  Affect: full range and appropriate; was congruent to mood; was congruent to content  Thought Process/Associations: unremarkable  Thought Content:  Reports none;  Denies suicidal ideation, violent ideation and delusions  Perception:  Reports none;  Denies auditory hallucinations and visual hallucinations  Insight: intact  Judgment: intact  Cognition: does  appear grossly intact; formal cognitive testing was not done  Gait and Station: [facility]    RISK AND PROTECTIVE FACTORS     Static Risk Factors: prior attempts, history of MH diagnoses and/or treatment and impulsivity  Firearms/Weapons Access: Yes Locked up  Dynamic Risk Factors: emotional distress, substance use, anxiety, access to means and mental health stigma    Protective Factors: hope for the future, compliance with medication, problem solving ability, future oriented, healthy intimate relationships, engaged in EBT, perceived internal locus of control, access to care as needed and displaying help seeking behavior    SAFETY ASSESSMENT     Based on review of above risk and protective factors and today's exam, pt is not at elevated risk of harm to self or others. She does not meet criteria for a 72 hr hold and remains appropriate for ongoing outpatient care. The patient convincingly denies suicidality today. There was no deceit detected, and the patient presented in a manner that was believable. Local community safety resources printed and reviewed for patient to use if needed.    Recommended that patient call 911 or go to the local ED should there be a change in any of these risk factors.    DSM 5 DIAGNOSIS     Attention-Deficit/Hyperactivity Disorder  314.00 (F90.0) Predominantly inattentive presentation  300.02 (F41.1) Generalized Anxiety Disorder    DIFFERENTIAL DIAGNOSIS: /o BAD (type I vs II vs cyclothymia)    Medical comorbidities impacting or contributing to clinical  "picture: None noted  Known issue that I take into account for their medical decisions, no current exacerbations or new concerns.    ASSESSMENT AND PLAN      ASSESSMENT:  Christiane Hernandez is a 32 year old White, female who presents for initial visit with [facility] ([facility]) for medication management. Pt with hx of ABIDA, ADHD, and Bipolar Disorder type I per psych testing who presents for medication consultation for ADHD sx. She is at times circumstantial and tangential during exam and a bit distractable, forgetting a question while she is in the middle of answering it. She has previously tried a few SSRI and NDRI for mood/anxiety in the past without good effects. I did explore her reported bipolar sx and am not convinced today that she has BAD type I, but will leave this in my differential. Given her hx of that diagnosis and reported \"manic\" episodes, I did discuss risks associated with most medications for mood/anxiety/ADHD that can cause manic episodes. I recommended against a stimulant today for that reason and instead offered she try atomoxetine for ADHD, mood/anxiety. We reviewed r/b/se and pt is agreeable to trial. She has a hx of morbid ideation but denies safety concerns or SI thoughts today.    TREATMENT PLAN: Medication side effects and alternatives reviewed. Health promotion activities recommended and reviewed. All questions addressed. Education and counseling completed regarding risks and benefits of medications and psychotherapy options. Collaborative Care Psychiatry Service model reviewed today. Recommend therapy for additional support. Safety plan reviewed as indicated.     MEDICATIONS:   -start ATOMOXETINE 25mg daily x 7 days then increase to 50mg daily    LABS/RADS:   -None at this time    PATIENT STATUS:  [facility] offer care a specialty service for Primary Care Providers in the [facility] that seek to optimize psychotropic medications for unstable " patients.  Once medications have been optimized, our providers discharge the patient back to the referring Primary Care Provider for ongoing medication management.  This type of system allows our providers to serve a high volume of patients.   -[facility]    PSYCHOSOCIAL:   -continue therapy  -Follow up with primary care provider as planned or for acute medical concerns.    PSYCHOEDUCATION:  Medication side effects and alternatives reviewed. Health promotion activities recommended and reviewed today. All questions addressed. Education and counseling completed regarding risks and benefits of medications and psychotherapy options.  Consent provided by patient/guardian  Call the psychiatric nurse line with medication questions or concerns at [phone].  Kuldat may be used to communicate with your provider, but this is not intended to be used for emergencies.  BLACK BOX WARNING: Discussed the Food and Drug Administration (FDA) requires that all antidepressants carry a warning that some children, adolescents and young adults may be at increased risk of suicide when taking antidepressants. Anyone taking an antidepressant should be watched closely for worsening depression or unusual behavior especially in the first few weeks after starting an SSRI. Keep in mind, antidepressants are more likely to reduce suicide risk in the long run by improving mood.   Medlineplus.gov is information for patients.  It is run by the National Library of Medicine and it contains information about all disorders, diseases and all medications.      FOLLOW-UP: Follow up in 3-4 weeks    1. Continue all other treatments (including medications) per primary care provider and/or specialists.   2. To schedule individual or family therapy, call [facility] at [phone].   3. Follow up with primary care provider as planned or for acute medical concerns.  4. Call the psychiatric  nurse line with medication questions or concerns at [phone] or [phone].  5. MyChart may be used to communicate with your care team, but this is not intended to be used for emergencies.    CRISIS RESOURCES:    1. Present to the Emergency Department as needed or call after hours crisis line at [phone] or [phone].   2. [place] Crisis Text Line: Text [place] to 252065.  3. Suicide LifeLine Chat: suicideChrono24.com.org/chat/.  4. National Suicide Prevention Lifeline: 944.473.6452 (TTY: 161.405.5166). Call anytime for help.  (www.suicidepreventionlifeline.org)  5. National [place] on Mental Illness (www.cesar.org): 399.569.6830 or 194-123-9636.  6. Mental Health Association (www.mentalhealth.org): 863.427.3675 or 085-823-6778.    ADMINISTRATIVE BILLING:    Time spent interviewing patient, reviewing referral documents, obtaining and reviewing outside records, communication with other health specialists, and preparing this report on today's date  Video/Phone Start Time: 0905  Video/Phone End Time: 0942    Signed:   Deisy Terrell DNP, [id number]  [facility] Psych[facility]ry Service ([facility])

## 2022-10-13 ENCOUNTER — VIRTUAL VISIT (OUTPATIENT)
Dept: BEHAVIORAL HEALTH | Facility: CLINIC | Age: 32
End: 2022-10-13
Payer: COMMERCIAL

## 2022-10-13 ENCOUNTER — VIRTUAL VISIT (OUTPATIENT)
Dept: PSYCHIATRY | Facility: CLINIC | Age: 32
End: 2022-10-13
Attending: NURSE PRACTITIONER
Payer: COMMERCIAL

## 2022-10-13 DIAGNOSIS — F90.9 ATTENTION DEFICIT HYPERACTIVITY DISORDER (ADHD), UNSPECIFIED ADHD TYPE: Primary | ICD-10-CM

## 2022-10-13 DIAGNOSIS — F33.0 MILD EPISODE OF RECURRENT MAJOR DEPRESSIVE DISORDER (H): ICD-10-CM

## 2022-10-13 DIAGNOSIS — F41.9 ANXIETY: ICD-10-CM

## 2022-10-13 DIAGNOSIS — F41.1 GENERALIZED ANXIETY DISORDER: ICD-10-CM

## 2022-10-13 DIAGNOSIS — F90.0 ADHD (ATTENTION DEFICIT HYPERACTIVITY DISORDER), INATTENTIVE TYPE: Primary | ICD-10-CM

## 2022-10-13 PROCEDURE — 99204 OFFICE O/P NEW MOD 45 MIN: CPT | Mod: GT | Performed by: STUDENT IN AN ORGANIZED HEALTH CARE EDUCATION/TRAINING PROGRAM

## 2022-10-13 PROCEDURE — 90791 PSYCH DIAGNOSTIC EVALUATION: CPT | Mod: GT | Performed by: SOCIAL WORKER

## 2022-10-13 RX ORDER — ATOMOXETINE 25 MG/1
CAPSULE ORAL
Qty: 67 CAPSULE | Refills: 0 | Status: SHIPPED | OUTPATIENT
Start: 2022-10-13 | End: 2022-10-19

## 2022-10-13 ASSESSMENT — COLUMBIA-SUICIDE SEVERITY RATING SCALE - C-SSRS
TOTAL  NUMBER OF ACTUAL ATTEMPTS LIFETIME: 1
ATTEMPT LIFETIME: YES
6. HAVE YOU EVER DONE ANYTHING, STARTED TO DO ANYTHING, OR PREPARED TO DO ANYTHING TO END YOUR LIFE?: NO
TOTAL  NUMBER OF ABORTED OR SELF INTERRUPTED ATTEMPTS LIFETIME: NO
1. IN THE PAST MONTH, HAVE YOU WISHED YOU WERE DEAD OR WISHED YOU COULD GO TO SLEEP AND NOT WAKE UP?: YES
TOTAL  NUMBER OF INTERRUPTED ATTEMPTS LIFETIME: NO
2. HAVE YOU ACTUALLY HAD ANY THOUGHTS OF KILLING YOURSELF?: NO
ATTEMPT PAST THREE MONTHS: NO
1. HAVE YOU WISHED YOU WERE DEAD OR WISHED YOU COULD GO TO SLEEP AND NOT WAKE UP?: YES

## 2022-10-13 NOTE — PROGRESS NOTES
"    [facility]  Provider Name:  Emily Maddox     Credentials:  [facility]    PATIENT'S NAME: Christiane Hernandez  PREFERRED NAME: Christiane  PRONOUNS: She/her/herself  MRN: [id number]  : 1990  ADDRESS:  [address] [place], [place] [id number]  ACCT. NUMBER:  [id number]  DATE OF SERVICE: 10/13/22  START TIME: 08:01 am  END TIME: 08:54 am  PREFERRED PHONE: [phone]  May we leave a program related message: Yes  SERVICE MODALITY:  Video Visit:      Provider verified identity through the following two step process.  Patient provided:  Patient was verified at admission/transfer    Telemedicine Visit: The patient's condition can be safely assessed and treated via synchronous audio and visual telemedicine encounter.      Reason for Telemedicine Visit: Services only offered telehealth    Originating Site (Patient Location): Patient's home    Distant Site (Provider Location): Provider Remote Setting- Home Office    Consent:  The patient/guardian has verbally consented to: the potential risks and benefits of telemedicine (video visit) versus in person care; bill my insurance or make self-payment for services provided; and responsibility for payment of non-covered services.     Patient would like the video invitation sent by:  My Chart    Mode of Communication:  Video Conference via [employer]    As the provider I attest to compliance with applicable laws and regulations related to telemedicine.    UNIVERSAL ADULT Mental Health DIAGNOSTIC ASSESSMENT    Identifying Information:  Patient is a 32 year old,   individual.    [facility]  Patient attended the session alone.    Chief Complaint:   The reason for seeking services at this time is: \"ADHD\".  The problem(s) began 22.    Patient reports a long history of depression and anxiety starting as a teenager.  She was started on medication and " therapy in 10th grade.  Patient describes that she has been on and off medication since then.  The patient feels that there are times she is able to manage her anxiety and depression without medication and does not want to be on medication long-term.  Patient has also been in and out of therapy.  Patient has been with her current therapist for over 4 years.  Patient was talking with her therapist about concerns related to lack of focus while in her graduate program and her therapist encouraged her to go through testing.  Patient did see a psychologist at [facility] last summer and was diagnosed with ADHD, primarily inattentive type. (Media report dated 04/26/2022).  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  Patient reports that she did not do anything after getting this diagnosis as she has always been wary of medication and was not sure how she would feel about being placed on a stimulant.  Patient did see a nurse practitioner through [facility] and was restarted on Lexapro approximately 6 to 9 months ago.  Patient took this for a month and then stopped as she did not feel it was helpful and was having negative side effects.  Patient indicates she has previously taken this medication and does not like it.  Patient was given a prescription for Adderall but never filled the prescription due to being skeptical about it working and the potential side effects.  Patient has also been getting pressure from her fiancé about not being on a stimulant.  Patient indicates they have had a lot of talks about this and she feels that he is more supportive now.  Patient reports that she was also told by the psychologist that she has bipolar 1 disorder and has never been diagnosed with this  "in the past.  Patient is not sure what to think of it.  Patient does describe a history of self-esteem issues and constantly comparing herself to others and feeling that she is lacking.  Patient admits that she does not have friends or same age because of this.  Patient has a history of impulsive spending and impulsive shopping.  Patient also reports a long history of boundary issues with her family.    SH: denies current or past  SI: Patient reports she has persistent thoughts that she feels \"stuck\" and nothing can be done to get out of her negative feelings.  She describes these thoughts are temporary but intense.  She ends up participating in a a lot of negative self talk.  Patient denies that she wants her life to be over but does have thoughts of \"not be here anymore.\"  Patient denies she has any current active thoughts of suicide and does feel safe.  Patient reports she did have an impulsive overdose at age 18 in which she suddenly had suicidal thoughts and took medication that was sitting right in front of her.  Patient regretted this and told her mother and they went to the emergency room.  She was given charcoal and kept overnight for observation and released home.  Patient denies any inpatient psychiatric admissions.  Patient denies any safety concerns at this time.  Patient was educated about accessing crisis resources to use as needed.  Patient does not feel she needs a safety plan at this time and has been talking about safety with her current therapist.  CSSRS completed.    Sleep: feels physically restless, has increased exercise. Average 6-7 hours. Feels tired but no naps. Vivid dreams but no nightmares. Does feel uncomfortable afterward.  Appetite: normal, does have some increased moments of binging related to mood    Tx: Dominique Moreno at [facility]. Has been seeing for 4+ years and now down to every three weeks. Working on relationship needs and self-worth. Able to address situational " "needs.    Patient has attempted to resolve these concerns in the past through Therapy, medications, exercise, self-care skills learned through therapy.    Social/Family History:  Patient reported they grew up in [place], [place].  They were raised by biological parents.  Parents were always together.  She has a younger brother. Patient reported that their childhood was \"good, fine.\" Being the oldest learned to be controlling and taking care of others.  Patient described their current relationships with family of origin as: some co-dependency and working on boundaries.     The patient describes their cultural background as .  Cultural influences and impact on patient's life structure, values, norms, and healthcare: [place] Not Oriental orthodox.  Contextual influences on patient's health include: Individual Factors anxiety with talking to others and appointments.    These factors will be addressed in the Preliminary Treatment plan. Patient identified their preferred language to be English. Patient reported they does not need the assistance of an  or other support involved in therapy.     Patient reported had no significant delays in developmental tasks.   Patient's highest education level was graduate school.  Patient identified the following learning problems: attention, concentration and procrastination, math.  Got good grades- did bare minimum. Modifications will not be used to assist communication in therapy. Patient reports they are  able to understand written materials.    Patient reported the following relationship history: started dating at age 18.  Patient's current relationship status is has a partner or significant other for 7 years.  She was  previously for one year and then  finalized 1.5 years after relationship ended. Did not work out as patient decided she had different goals for her future.  She denies that any of her relationships have been abusive or " unhealthy.  Patient identified their sexual orientation as heterosexual.  Patient reported having zero child(kevin). Patient identified partner; parents; siblings; pets; therapist as part of their support system.  Patient identified the quality of these relationships as fair.      Patient's current living/housing situation involves staying in own home/apartment.  The immediate members of family and household include Yosi Erazo, KattEdel and they report that housing is stable.    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 Overwhelmed and issues with focus. Patient reports their finances are obtained through employment; partner. Patient does identify finances as a current stressor.      Patient reported that they have not been involved with the legal system.   Patient does not report being under probation/ parole/ jurisdiction.    Patient's Strengths and Limitations:  Patient identified the following strengths or resources that will help them succeed in treatment: commitment to health and well being, exercise routine, family support, insight, intelligence, positive work environment, motivation and work ethic. Things that may interfere with the patient's success in treatment include: few friends.     Assessments:  The following assessments were completed by patient for this visit:  PHQ9:   PHQ-9 SCORE 5/9/2019 8/25/2020 4/12/2022 10/12/2022 10/12/2022   PHQ-9 Total Score MyChart - - 15 (Moderately severe depression) - 9 (Mild depression)   PHQ-9 Total Score 2 7 15 9 9     GAD7:   ABIDA-7 SCORE 8/25/2020 10/12/2022 10/12/2022   Total Score - - 4 (minimal anxiety)   Total Score 14 4 4     CAGE-AID:   CAGE-AID Total Score 10/6/2022 10/6/2022   Total Score 2 2   Total Score MyChart - 2 (A total score of 2 or greater is considered clinically significant)     PROMIS 10-Global Health (all questions and answers displayed):   PROMIS 10 10/12/2022 10/12/2022   In general, would you say your health is: - Good    In general, would you say your quality of life is: - Good   In general, how would you rate your physical health? - Good   In general, how would you rate your mental health, including your mood and your ability to think? - Poor   In general, how would you rate your satisfaction with your social activities and relationships? - Fair   In general, please rate how well you carry out your usual social activities and roles - Fair   To what extent are you able to carry out your everyday physical activities such as walking, climbing stairs, carrying groceries, or moving a chair? - Completely   How often have you been bothered by emotional problems such as feeling anxious, depressed or irritable? - Sometimes   How would you rate your fatigue on average? - Moderate   How would you rate your pain on average?   0 = No Pain  to  10 = Worst Imaginable Pain - 0   In general, would you say your health is: 3 3   In general, would you say your quality of life is: 3 3   In general, how would you rate your physical health? 3 3   In general, how would you rate your mental health, including your mood and your ability to think? 1 1   In general, how would you rate your satisfaction with your social activities and relationships? 2 2   In general, please rate how well you carry out your usual social activities and roles. (This includes activities at home, at work and in your community, and responsibilities as a parent, child, spouse, employee, friend, etc.) 2 2   To what extent are you able to carry out your everyday physical activities such as walking, climbing stairs, carrying groceries, or moving a chair? 5 5   In the past 7 days, how often have you been bothered by emotional problems such as feeling anxious, depressed, or irritable? 3 3   In the past 7 days, how would you rate your fatigue on average? 3 3   In the past 7 days, how would you rate your pain on average, where 0 means no pain, and 10 means worst imaginable pain? 0 0  "  Global Mental Health Score 9 9   Global Physical Health Score 16 16   PROMIS TOTAL - SUBSCORES 25 25   Some recent data might be hidden     [place] Suicide Severity Rating Scale (Lifetime/Recent)  [place] Suicide Severity Rating (Lifetime/Recent) 10/13/2022   1. Wish to be Dead (Lifetime) 1   Wish to be Dead Description (Lifetime) Patient reports she started having thoughts about \"not wanting to be here\" as a teenager.   1. Wish to be Dead (Past 1 Month) 1   Wish to be Dead Description (Past 1 Month) Patient reports she has persistent thoughts of \"not wanting to be here\" but denies that she wants to be dead or for her life to be over.   2. Non-Specific Active Suicidal Thoughts (Lifetime) 0   Actual Attempt (Lifetime) 1   Total Number of Actual Attempts (Lifetime) 1   Actual Attempt Description (Lifetime) Patient had an impulsive suicide attempt at age 18 in which she took medication and then told her mother.   Actual Attempt (Past 3 Months) 0   Has subject engaged in non-suicidal self-injurious behavior? (Lifetime) 0   Interrupted Attempts (Lifetime) 0   Aborted or Self-Interrupted Attempt (Lifetime) 0   Preparatory Acts or Behavior (Lifetime) 0   Calculated C-SSRS Risk Score (Lifetime/Recent) Moderate Risk       Personal and Family Medical History:  Patient does report a family history of mental health concerns.  Patient reports family history includes Atrial fibrillation in her father; Breast Cancer in her maternal aunt, maternal grandfather, and maternal grandmother; Colitis in her mother; Dementia in her maternal grandfather; Skin Cancer in her father; Sleep Apnea in her father..     Patient does report Mental Health Diagnosis and/or Treatment.  Patient Patient reported the following previous diagnoses which include(s): an Anxiety Disorder and Depression.  Patient reported symptoms began as a teenager and sought treatment in 10th grade. Started on medication and therapy.   Patient has received mental health " "services in the past: therapy with Previous therapists and her current therapist. Has seen 3-4 \"prescribers\" in the past. Psychiatric Hospitalizations: None.  Patient denies a history of civil commitment.  Patient is receiving other mental health services.  These include psychotherapy with Current therapist and primary care provider at [facility].  For follow-up on As needed.       Patient has had a physical exam to rule out medical causes for current symptoms.  Date of last physical exam was within the past year. Client was encouraged to follow up with PCP if symptoms were to develop. The patient has a [place] Primary Care Provider, who is named No Ref-Primary, Physician..  Patient reports no current medical and/or dental concerns.  Patient denies any issues with pain..   There are not significant appetite / nutritional concerns / weight changes.   Patient does not report a history of head injury / trauma / cognitive impairment.    Patient reports current meds as:   No outpatient medications have been marked as taking for the 10/13/22 encounter (Appointment) with Emily Maddox LICSW.       Medication Adherence:  Patient reports taking.  taking prescribed medications as prescribed.    Patient Allergies:    Allergies   Allergen Reactions     Zithromax [Azithromycin]      Nausea and vomiting       Medical History:    Past Medical History:   Diagnosis Date     Irregular menses      Kidney stones      Mood disorder (H)     anxiety, depression, possible BAD     PMDD (premenstrual dysphoric disorder)          Current Mental Status Exam:   Appearance:  Appropriate    Eye Contact:  Good   Psychomotor:  Normal       Gait / station:  no problem  Attitude / Demeanor: Cooperative  Interested  Speech      Rate / Production: Normal/ Responsive      Volume:  Normal  volume      Language:  intact  Mood:   Anxious  Depressed   Affect:   Appropriate    Thought Content: Clear   Thought Process: Coherent  Logical       " Associations: No loosening of associations  Insight:   Good   Judgment:  Intact   Orientation:  All  Attention/concentration: Good and Needs Redirection      Substance Use:  Patient did not report a family history of substance use concerns; see medical history section for details.  Patient has not received chemical dependency treatment in the past.  Patient has not ever been to detox.      Patient is not currently receiving any chemical dependency treatment.         Substance History of use Age of first use Date of last use     Pattern and duration of use (include amounts and frequency)   Alcohol currently use   19 09/30/22 REPORTS SUBSTANCE USE: reports using substance 2 times per month and has 3 drinks at a time.   Patient reports heaviest use was 5-7 years ago.   Cannabis   currently use 19 09/05/22 REPORTS SUBSTANCE USE: reports using substance 2 times per month and has 1 gummy at a time.   Patient reports heaviest use was never.   a gummy to relax or sleep   Amphetamines   never used     REPORTS SUBSTANCE USE: N/A   Cocaine/crack    never used       REPORTS SUBSTANCE USE: N/A   Hallucinogens used in the past   31 07/29/22   Tried mushrooms 3 times REPORTS SUBSTANCE USE: N/A   Inhalants never used         REPORTS SUBSTANCE USE: N/A   Heroin never used         REPORTS SUBSTANCE USE: N/A   Other Opiates never used     REPORTS SUBSTANCE USE: N/A   Benzodiazepine   never used     REPORTS SUBSTANCE USE: N/A   Barbiturates never used     REPORTS SUBSTANCE USE: N/A   Over the counter meds never used     REPORTS SUBSTANCE USE: N/A   Caffeine currently use 17   REPORTS SUBSTANCE USE: reports using substance 1 times per day and has 1 20 oz green tea at a time.   Patient reports heaviest use was previously- cut back 4-5 years ago.   Nicotine  never used     REPORTS SUBSTANCE USE: N/A   Other substances not listed above:  Identify:  never used     REPORTS SUBSTANCE USE: N/A     Patient reported the following problems as a  result of their substance use: no problems, not applicable.    Substance Use: No symptoms    Based on the negative CAGE score and clinical interview there  are not indications of drug or alcohol abuse.      Significant Losses / Trauma / Abuse / Neglect Issues:   Patient did not serve in the .  There are indications or report of significant loss, trauma, abuse or neglect issues related to: are no indications and client denies any losses, trauma, abuse, or neglect concerns.  Concerns for possible neglect are not present.     Safety Assessment:   Patient denies current homicidal ideation and behaviors.  Patient denies current self-injurious ideation and behaviors.    Patient denied risk behaviors associated with substance use.  Patient reported impulsive/compulsive spending behaviors associated with mental health symptoms.  Patient reports the following current concerns for their personal safety: None.  Patient reports there are firearms in the house.     yes, they are secured. The firearms are secured in a locked space.    History of Safety Concerns:  Patient denied a history of homicidal ideation.     Patient denied a history of personal safety concerns.    Patient denied a history of assaultive behaviors.    Patient denied a history of sexual assault behaviors.     Patient denied a history of risk behaviors associated with substance use.  Patient reported a history of impulsive/compulsive spending behaviors associated with mental health symptoms.  Patient reports the following protective factors: forward or future oriented thinking; dedication to family or friends; safe and stable environment; regular sleep; regular physical activity; secure attachment; structured day; commitment to well being; healthy fear of risky behaviors or pain; access to a variety of clinical interventions and pets    Risk Plan:  See Recommendations for Safety and Risk Management Plan    Review of Symptoms per patient  report:  Depression: Excessive or inappropriate guilt, Change in energy level, Difficulties concentrating, Suicidal ideation, Feelings of hopelessness, Feelings of helplessness, Low self-worth, Ruminations, Irritability, Feeling sad, down, or depressed, Withdrawn and Anger outbursts  Lay:  Elevated mood, Racing thoughts, Increased activity, Restlessness, Distractibility and Impulsiveness little more than normal and does notice it- expects the next day to be less energy  Psychosis: No Symptoms  Anxiety: Social anxiety, Psychomotor agitation and feels more physically- shaking, heart rate increases, sweating, cold and shivery, gets more fidgety. Anxiety overall has greatly improved.  Panic:  No symptoms  Post Traumatic Stress Disorder:  No Symptoms   Eating Disorder: No Symptoms  ADD / ADHD:  Inattentive, Poor task completion, Poor organizational skills, Distractibility, Forgetful, Interrupts, Impulsive, Restlessness/fidgety, Hyperverbal, Hyperactive and not able to carry thoughts, communication issues, thoughts jumble  Conduct Disorder: No symptoms  Autism Spectrum Disorder: No symptoms  Obsessive Compulsive Disorder: No Symptoms    Patient reports the following compulsive behaviors and treatment history: Shopping / Spending - has not had treatment. working on in therapy.      Diagnostic Criteria:   Generalized Anxiety Disorder  A. Excessive anxiety and worry about a number of events or activities (such as work or school performance).   B. The person finds it difficult to control the worry.  C. Select 3 or more symptoms (required for diagnosis). Only one item is required in children.   - Restlessness or feeling keyed up or on edge.    - Being easily fatigued.    - Difficulty concentrating or mind going blank.    - Irritability.    - Sleep disturbance (difficulty falling or staying asleep, or restless unsatisfying sleep).   D. The focus of the anxiety and worry is not confined to features of an Axis I disorder.  E.  The anxiety, worry, or physical symptoms cause clinically significant distress or impairment in social, occupational, or other important areas of functioning.   F. The disturbance is not due to the direct physiological effects of a substance (e.g., a drug of abuse, a medication) or a general medical condition (e.g., hyperthyroidism) and does not occur exclusively during a Mood Disorder, a Psychotic Disorder, or a Pervasive Developmental Disorder.    - The aformentioned symptoms began Several year(s) ago and occurs 7 days per week and is experienced as moderate. Major Depressive Disorder  CRITERIA (A-C) REPRESENT A MAJOR DEPRESSIVE EPISODE - SELECT THESE CRITERIA  A) Recurrent episode(s) - symptoms have been present during the same 2-week period and represent a change from previous functioning 5 or more symptoms (required for diagnosis)   - Depressed mood. Note: In children and adolescents, can be irritable mood.     - Diminished interest or pleasure in all, or almost all, activities.    - Fatigue or loss of energy.    - Feelings of worthlessness or inappropriate and excessive guilt.    - Diminished ability to think or concentrate, or indecisiveness.    - Recurrent thoughts of death (not just fear of dying), recurrent suicidal ideation without a specific plan, or a suicide attempt or a specific plan for committing suicide.   B) The symptoms cause clinically significant distress or impairment in social, occupational, or other important areas of functioning  C) The episode is not attributable to the physiological effects of a substance or to another medical condition  D) The occurence of major depressive episode is not better explained by other thought / psychotic disorders  E) There has never been a manic episode or hypomanic episode Attention Deficit Hyperactivity Disorder  A) A persistent pattern of inattention and/or hyperactivity-impulsivity that interferes with functioning or development, as characterized by (1)  Inattention and/or (2) Hyperactivity and Impulsivity  (1) Inattention: 6 or more of the following symptoms have persisted for at least 6 months to a degree that is inconsistent with developmental level and that negatively impacts directly on social and academic/occupational activities:  - Often fails to give close attention to details or makes careless mistakes in schoolwork, at work, or during other activities  - Often has difficulty sustaining attention in tasks or play activities  - Often does not seem to listen when spoken to directly  - Often does not follow through on instructions and fails to finish schoolwork, chores, or duties in the workplace  - Often has difficulty organizing tasks and activities  - Often loses things necessary for tasks or activities  - Is often easily distractedby extraneous stimuli  - Is often forgetful in daily activities  (2) Hyeractivity and Impulsivity: 6 or more of the following symptoms have persisted for at least 6 months to a degree that is inconsistent with developmental level and that negatively impacts directly on social and academic/occupational activities:  - Often fidgets with or taps hands or feet or squirms in seat  - Often talks excessively  - Often blurts out an answer before a question has been completed  - Often has difficulty waiting his or her turn  - Often interrupts or intrudes on others  B) Several inattentive or hyperactive-impulsive symptoms were present prior to age 12 years  C) Several inattentive or hyperactive-impulsive symptoms are present in two or more settings  D) There is clear evidence that the symptoms interfere with, or reduce the quality of, social academic, or occupational functioning  E) The Symptoms do not occur exclusively during the course of schizophrenia or another psychotic disorder and are not better explained by another mental disorder      Functional Status:  Patient reports the following functional impairments:  academic performance,  management of the household and or completion of tasks, money management, relationship(s), social interactions and work / vocational responsibilities.     Nonprogrammatic care:  Patient is requesting basic services to address current mental health concerns.    Clinical Summary:  1. Reason for assessment: Medication stabilization  2. Psychosocial, Cultural and Contextual Factors:  Patient lives with her fiancé.  She is finishing graduate school and working full-time.  She does have some supports. .  3. 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  4. Other Diagnoses that is relevant to services:   Patient was diagnosed with bipolar I disorder during psychological testing in 2021.  This will be monitored and considered.  5. Provisional Diagnosis: Not applicable.  6. Prognosis: Relieve Acute Symptoms.  7. Likely consequences of symptoms if not treated: Patient would continue to experience negative symptoms that impact several areas of her life.  8. Client strengths include:  caring, creative, educated, empathetic, employed, goal-focused, good listener, has a previous history of therapy, insightful, intelligent, motivated, open to learning, open to suggestions / feedback, supportive, wants to learn, willing to ask questions, willing to relate to others and work history .     Recommendations:     1. Plan for Safety and Risk Management:   Safety and Risk: Recommended that patient call 911 or go to the local ED should there be a change in any of these risk factors..          Report to child / adult protection services was NA.     2. Patient's identified mental health concerns with a cultural influence will be addressed by Patient recognizes she does get anxiety  regarding appointments and meeting with people, even virtually.     3. Initial Treatment will focus on:    Depressed Mood - Patient will report improvement in her depression  Anxiety - Patient will report improvement in her anxiety, especially the physical symptoms  Attentional Problems - Patient will report increased focus and task completion.     4. Resources/Service Plan:    services are not indicated.   Modifications to assist communication are not indicated.   Additional disability accommodations are not indicated.      5. Collaboration:   Collaboration / coordination of treatment will be initiated with the following  support professionals: primary care physician, outpatient therapist and psychiatry.      6.  Referrals:   The following referral(s) will be initiated: Patient is already engaged in individual therapy and declines any other mental health referrals at this time.  She does not feel that she needs long-term psychiatry support. Next Scheduled Appointment: As needed.     A Release of Information has been obtained for the following: Patient agreed to complete an COLE if needed to communicate with her current therapist.     Emergency Contact was not obtained.     7. TYRA:    TYRA:  Discussed the general effects of drugs and alcohol on health and well-being. Provider gave patient printed information about the effects of chemical use on their health and well being. Recommendations: No apparent concerns at this time.     8. Records:   These were reviewed at time of assessment.   Information in this assessment was obtained from the medical record and  provided by patient who is a good historian.    Patient will have open access to their mental health medical record.      Provider Name/ Credentials:  Mor Rizo  October 13, 2022        Answers for HPI/ROS submitted by the patient on 10/12/2022  If you checked off any problems, how difficult have these problems made it for you to do your  work, take care of things at home, or get along with other people?: Somewhat difficult  PHQ9 TOTAL SCORE: 9  ABIDA 7 TOTAL SCORE: 4

## 2022-10-13 NOTE — PATIENT INSTRUCTIONS
Thank you for our time together today in [facility] ([facility] provides brief psychiatric medication stabilization to patients referred by their Primary Care Providers. Patients are typically seen in [facility] for up to 4 appointments and then referred back to the PCP for ongoing refills unless longer term medication management by a specialist is indicated. If I believe you will benefit from long-term psychiatric care I will discuss this with you. If you are interested in seeing a psychiatrist or psychiatric nurse practitioner long-term, please send me a message in [employer] so we can refer you appropriately.     Treatment Plan:    Start atomoxetine 25mg daily x 7 days then increase to 50mg daily for mood/anxiety/ADHD  Call [phone] or [phone] to schedule follow up with me in 4 weeks.

## 2022-10-13 NOTE — PROGRESS NOTES
Christiane Hernandez  is being evaluated via a billable video visit.      How would you like to obtain your AVS? MyChar  For the video visit, send the invitation by: Send to e-mail at: [email or web address]  Will anyone else be joining your video visit? Luisana ELIZABETH

## 2022-10-14 ENCOUNTER — TELEPHONE (OUTPATIENT)
Dept: PSYCHIATRY | Facility: CLINIC | Age: 32
End: 2022-10-14

## 2022-10-14 NOTE — TELEPHONE ENCOUNTER
Prior Authorization Retail Medication Request    Medication/Dose: atomoxetine (STRATTERA) 25 MG capsule  ICD code (if different than what is on RX): ADHD (attention deficit hyperactivity disorder), inattentive type [F90.0] - Generalized anxiety disorder [F41.1] - Mild episode of recurrent major depressive disorder (H) [F33.0]   Previously Tried and Failed:    Rationale:      Insurance Name:  [employer]  Insurance ID:  [id number]    Pharmacy Information (if different than what is on RX)  Name:  [facility] - [place], [place] - [address]  Phone:  [phone]

## 2022-10-14 NOTE — TELEPHONE ENCOUNTER
Central Prior Authorization Team   Phone: [phone]      PA Initiation    Medication: atomoxetine (STRATTERA) 25 MG capsule-PA initiated  Insurance Company: [employer] ([facility]) - Phone [phone] Fax [phone]  Pharmacy Filling the Rx: [facility] - [place], [place] - [address]  Filling Pharmacy Phone: [phone]  Filling Pharmacy Fax:    Start Date: 10/14/2022

## 2022-10-18 ENCOUNTER — MYC MEDICAL ADVICE (OUTPATIENT)
Dept: PSYCHIATRY | Facility: CLINIC | Age: 32
End: 2022-10-18

## 2022-10-18 DIAGNOSIS — F90.9 ATTENTION DEFICIT HYPERACTIVITY DISORDER (ADHD), UNSPECIFIED ADHD TYPE: Primary | ICD-10-CM

## 2022-10-18 NOTE — TELEPHONE ENCOUNTER
Prior Authorization Approval    Authorization Effective Date: 10/14/2022  Authorization Expiration Date: 10/14/2023  Medication: atomoxetine (STRATTERA) 25 MG capsule-PA approved  Approved Dose/Quantity:   Reference #: [id number]   Insurance Company: [email or web address] ([facility]) - Phone [phone] Fax [phone]  Expected CoPay:       CoPay Card Available:      Foundation Assistance Needed:    Which Pharmacy is filling the prescription (Not needed for infusion/clinic administered): [facility] - [place], [place] - [address]  Pharmacy Notified: Yes  Patient Notified: No

## 2022-10-19 RX ORDER — ATOMOXETINE 25 MG/1
CAPSULE ORAL
Qty: 67 CAPSULE | Refills: 0 | Status: SHIPPED | OUTPATIENT
Start: 2022-10-19 | End: 2022-11-16

## 2022-11-07 DIAGNOSIS — Z30.41 ENCOUNTER FOR SURVEILLANCE OF CONTRACEPTIVE PILLS: ICD-10-CM

## 2022-11-09 RX ORDER — NORETHINDRONE ACETATE AND ETHINYL ESTRADIOL .02; 1 MG/1; MG/1
1 TABLET ORAL DAILY
Qty: 84 TABLET | Refills: 1 | Status: SHIPPED | OUTPATIENT
Start: 2022-11-09 | End: 2023-03-06

## 2022-11-09 NOTE — TELEPHONE ENCOUNTER
Prescription approved per [facility] Refill Protocol.    Writer responded via [employer].    GISELLA DunhamN MARY  [facility]

## 2022-11-16 ENCOUNTER — MYC REFILL (OUTPATIENT)
Dept: PSYCHIATRY | Facility: CLINIC | Age: 32
End: 2022-11-16

## 2022-11-16 DIAGNOSIS — F90.9 ATTENTION DEFICIT HYPERACTIVITY DISORDER (ADHD), UNSPECIFIED ADHD TYPE: ICD-10-CM

## 2022-11-16 RX ORDER — ATOMOXETINE 25 MG/1
50 CAPSULE ORAL DAILY
Qty: 60 CAPSULE | Refills: 1 | Status: SHIPPED | OUTPATIENT
Start: 2022-11-16 | End: 2022-12-20

## 2022-11-16 NOTE — TELEPHONE ENCOUNTER
Date of Last Office Visit: 10/13/22  Date of Next Office Visit: 12/1/22  No shows since last visit: 0  Cancellations since last visit: 1    Medication requested: atomoxetine (STRATTERA) 25 MG capsule Date last ordered: 10/19/22 Qty: 67 Refills: 0     Review of [place] ?: Do not have access.  Medication last filled date: 10/19/22 Qty filled: 67    Lapse in medication adherence greater than 5 days?: no  If yes, call patient and gather details: na  Medication refill request verified as identical to current order?: no, Changed to the 50mg dose.  Result of Last DAM, VPA, Li+ Level, CBC, or Carbamazepine Level (at or since last visit): N/A    Last visit treatment plan: TREATMENT PLAN: Medication side effects and alternatives reviewed. Health promotion activities recommended and reviewed.    start ATOMOXETINE 25mg daily x 7 days then increase to 50mg daily           []Medication refilled per  Medication Refill in Ambulatory Care  policy.  [x]Medication unable to be refilled by RN due to criteria not met as indicated below:    []Eligibility - not seen in the last year   []Supervision - no future appointment   []Compliance - no shows, cancellations or lapse in therapy   []Verification - order discrepancy   [x]Controlled medication   [x]Medication not included in policy   []90-day supply request   []Other

## 2022-11-30 NOTE — PROGRESS NOTES
"Christiane Hernandez  is being evaluated via a billable video visit.      How would you like to obtain your AVS? [employer]  For the video visit, send the invitation by: Send to e-mail at: [email or web address]  Will anyone else be joining your video visit? Luisana ELIZABETH          [facility] PSYCHIATRY SERVICE FOLLOW-UP     Name:  Christiane Hernandez  : 1990     Telemedicine Visit: The patient's condition can be safely assessed and treated via synchronous audio and visual telemedicine encounter.      Reason for Telemedicine Visit: COVID 19 pandemic and the social and physical recommendations by the CDC and MDH.      Originating Site (Patient Location): Patient's home     Distant Site (Provider Location): Provider Remote Setting     Consent:  The patient/guardian has verbally consented to: the potential risks and benefits of telemedicine (video visit or phone) versus in person care; bill my insurance or make self-payment for services provided; and responsibility for payment of non-covered services.     Mode of Communication:  [employer] video platform      As the provider I attest to compliance with applicable laws and regulations related to telemedicine.    IDENTIFICATION     Patient is a 32 year old year old White, female  who presents for follow-up medication management with [facility].  Patient was initially referred by their PCP. Patient attended the session alone.     Patient care team: Patient Care Team:  No Ref-Primary, Physician as PCP - Eliane Glynn NP as Assigned PCP  Radha Terrell NP as Assigned Neuroscience Provider    INTERIM HISTORY   Pt was last seen in [facility] for intake on . At that time, the plan included start atomoxetine.    Interim pt communication:  rx to [facility]    Available records were reviewed prior to visit.    HISTORY OF PRESENT ILLNESS     Per [facility] Emily Maddox during today's team-based visit: \"MH Update: \"ok.\" Not noticing any significant changes other " "than side effects. Not able to start for the first couple of weeks but eventually started in the evening. Able to fall asleep but then wake up a few hours later and feel restless and not get back to sleep. Started to taking in the morning and started feeling jittery and need to take with food. Experiences stiffness in neck and body still. Is having \"bursts\" of focus but not really feeling overall focused or productive. Starts day with feeling overwhelmed with work and what needs to be done. Mood has been stable but does feel more tired during the evenings after working all day. Could be seasonal. Deleted [employer] and helping to control impulsive social media binges and shopping.  [facility] praised patient for her progress and insight.  [facility] discussed patient using behavioral techniques by establishing a routine that helps her prepare for work and getting into the work mindset and setting up a more formal dedicated work space.  [facility] discussed the importance and finding separation between work and home.  [facility] praised patient for stepping back from social media and trying to avoid addictive behaviors at this time.  [facility] provided encouragement that patient may need to allow more time as she continues to work on her medication and behavioral techniques.  Stressors: getting darker sooner, holidays can be stressful with past history and family expectations  Tx: Dominique Moreno at [facility]. Has been seeing every other week or every three weeks for maintenance. Going well.  Etoh: not often, couple times a month  Substance: cannabis gummies a couple times a month  Nicotine: denies, non smoker  Caffeine: 1 green tea a day  Most Important: increase dose, keep trying and give it more time? Change medication?\"    ---Psychiatry Update---  Mood/anxiety: Pt says she is not feeling like herself since starting atomoxetine. She continues to feel restless, jittery, and has some muscle stiffness. Pt describes ongoing problems with " ADHD. She is not feeling like she can organize her thoughts. She gets down on herself because she feels she is not able to be successful in her job now because she has trouble sitting still to get the work done.   Suicidal ideation:  No   PHQ9 score is 8 indicating mild depression.   GAD7 score is 1 indicates minimal anxiety    Sleep: Pt is sleeping well, 7-9h/night.   Appetite: Stable     Medications: Pt has muscle soreness and problems with extremity circulation since starting atomoxetine. Some jittery feelings.     Medical: No new medical concerns.     MEDICATIONS                                                                                              Current medications reviewed today and are noted below.   Current psychotropic medications:   Atomoxetine 50mg     Past psychotropic medications:  Escitalopram - intolerable SEs  Bupropion  Vilazodone - worked well for anx/dep. Didn't feel emotionally numb.     Supplements:   See below      Not reviewed    Current Outpatient Medications   Medication Sig     atomoxetine (STRATTERA) 25 MG capsule Take 2 capsules (50 mg) by mouth daily for 60 days     IBUPROFEN PO Take by mouth as needed for moderate pain     Multiple Vitamin (MULTI-VITAMIN DAILY PO) Take by mouth daily     norethindrone-ethinyl estradiol (MICROGESTIN 1/20) 1-20 MG-MCG tablet Take 1 tablet by mouth daily     valACYclovir (VALTREX) 1000 mg tablet Take 2 tablets twice daily as needed for cold sores     No current facility-administered medications for this visit.      VITALS   There were no vitals taken for this visit.    Pulse Readings from Last 5 Encounters:   04/12/22 65   02/08/21 64   08/25/20 64   02/13/20 72   05/09/19 75     Wt Readings from Last 5 Encounters:   04/12/22 72.1 kg (159 lb)   02/08/21 68.5 kg (151 lb)   08/25/20 64.4 kg (142 lb)   02/13/20 65.8 kg (145 lb)   05/09/19 61 kg (134 lb 6.4 oz)     BP Readings from Last 5 Encounters:   04/12/22 132/78   02/08/21 120/75   08/25/20  112/78   02/13/20 124/66   05/09/19 100/68     LABS & IMAGING                                                                                                                Recent available labs reviewed today.    Recent Labs   Lab Test 11/15/16  0000   CR 0.76   GFRESTIMATED 109     Recent Labs   Lab Test 11/15/16  0000   AST 13   ALT 12     Recent Labs   Lab Test 04/12/22  1643 11/15/16  0000   TSH 1.70 0.76     ALLERGY & IMMUNIZATIONS       Allergies   Allergen Reactions     Zithromax [Azithromycin]      Nausea and vomiting     MEDICAL & SURGICAL HISTORY    Reviewed past medical and surgical history today.   Pregnant - denies.     Past Medical History:   Diagnosis Date     Irregular menses      Kidney stones      Mood disorder (H)     anxiety, depression, possible BAD     PMDD (premenstrual dysphoric disorder)        MENTAL STATUS EXAM:     Alertness: alert  and oriented  Appearance: adequately groomed  Behavior/Demeanor: cooperative, pleasant and calm, with good eye contact   Speech: normal and regular rate and rhythm  Language: intact and no problems  Psychomotor: normal or unremarkable  Mood: euthymia  Affect: full range and appropriate; was congruent to mood; was congruent to content  Thought Process/Associations: unremarkable  Thought Content:  Reports none;  Denies suicidal ideation, violent ideation and delusions  Perception:  Reports none;  Denies auditory hallucinations and visual hallucinations  Insight: intact  Judgment: intact  Cognition: does  appear grossly intact; formal cognitive testing was not done  Gait and Station: FRAN     RISK AND PROTECTIVE FACTORS     Static Risk Factors: prior attempts, history of MH diagnoses and/or treatment and impulsivity  Firearms/Weapons Access: Yes Locked up  Dynamic Risk Factors: emotional distress, substance use, anxiety, access to means and mental health stigma     Protective Factors: hope for the future, compliance with medication, problem solving ability,  "future oriented, healthy intimate relationships, engaged in EBT, perceived internal locus of control, access to care as needed and displaying help seeking behavior     SAFETY ASSESSMENT      Based on review of above risk and protective factors and today's exam, pt is not at elevated risk of harm to self or others. She does not meet criteria for a 72 hr hold and remains appropriate for ongoing outpatient care. The patient convincingly denies suicidality today. There was no deceit detected, and the patient presented in a manner that was believable. Local community safety resources printed and reviewed for patient to use if needed.     Recommended that patient call 911 or go to the local ED should there be a change in any of these risk factors.     DSM 5 DIAGNOSIS      Attention-Deficit/Hyperactivity Disorder  314.00 (F90.0) Predominantly inattentive presentation  300.02 (F41.1) Generalized Anxiety Disorder     DIFFERENTIAL DIAGNOSIS: r/o BAD (type I vs II vs cyclothymia)     Medical comorbidities impacting or contributing to clinical picture: None noted  Known issue that I take into account for their medical decisions, no current exacerbations or new concerns.    ASSESSMENT AND PLAN      ASSESSMENT:  Christiane Hernandez is a 32 year old White, female who presents for return visit with [facility] ([facility]) for medication management.   13 Oct 22: Pt with hx of ABIDA, ADHD, and Bipolar Disorder type I per psych testing who presents for medication consultation for ADHD sx. She is at times circumstantial and tangential during exam and a bit distractable, forgetting a question while she is in the middle of answering it. She has previously tried a few SSRI and NDRI for mood/anxiety in the past without good effects. I did explore her reported bipolar sx and am not convinced today that she has BAD type I, but will leave this in my differential. Given her hx of that diagnosis and reported \"manic\" episodes, I " did discuss risks associated with most medications for mood/anxiety/ADHD that can cause manic episodes. I recommended against a stimulant today for that reason and instead offered she try atomoxetine for ADHD, mood/anxiety. We reviewed r/b/se and pt is agreeable to trial. She has a hx of morbid ideation but denies safety concerns or SI thoughts today.  Today 1 Dec 22: Pt with hx of ABIDA, ADHD, and Bipolar Disorder type I per psych testing who returns reporting no improvement in ADHD sx since starting atomoxetine, and c/ muscle stiffness and restlessness since initiation. We discussed that this is not likely to be an effective medication for her ADHD long term based on the past 4-6 weeks so I advised stopping. I did review stimulants as first line options for ADHD in adults and reviewed my rec for methylphenidate trial. We reviewed r/b/se and pt is agreeable to trial of Concerta. I did again review risks of hypomania though I am still skeptical of her BAD dx. Pt denies safety concerns and is agreeable to f/up in a few weeks for close re-eval.     TREATMENT PLAN: Medication side effects and alternatives reviewed. Health promotion activities recommended and reviewed. All questions addressed. Education and counseling completed regarding risks and benefits of medications and psychotherapy options. [facility] model reviewed today. Recommend therapy for additional support. Safety plan reviewed as indicated.     MEDICATIONS:   -stop ATOMOXETINE 50mg daily  -start CONCERTA 18mg daily     LABS/RADS:   -None at this time    PATIENT STATUS:  [facility] MD/DO/NP/PA providers offer care a specialty service for Primary Care Providers in the [facility] that seek to optimize psychotropic medications for unstable patients.  Once medications have been optimized, our providers discharge the patient back to the referring Primary Care Provider for ongoing medication management.  This type of system allows our  providers to serve a high volume of patients.   -[facility]    PSYCHOSOCIAL:   -continue therapy  -Follow up with primary care provider as planned or for acute medical concerns.    PSYCHOEDUCATION:  Medication side effects and alternatives reviewed. Health promotion activities recommended and reviewed today. All questions addressed. Education and counseling completed regarding risks and benefits of medications and psychotherapy options.  Consent provided by patient/guardian  Call the psychiatric nurse line with medication questions or concerns at [phone].  MyChart may be used to communicate with your provider, but this is not intended to be used for emergencies.  STIMULANT THERAPY: Side effects discussed including but not limited to cardiac (including HTN, tachycardia, sudden death), motor/tic, appetite/growth, mood lability and sleep disruption. This is a controlled substance with risk for abuse, need to keep in a safe keep place and cannot replace lost scripts  Medlineplus.gov is information for patients.  It is run by the [employer] and it contains information about all disorders, diseases and all medications.      FOLLOW-UP: Follow up in 2-3 weeks    1. Continue all other treatments (including medications) per primary care provider and/or specialists.   2. To schedule individual or family therapy, call [facility] at [phone].   3. Follow up with primary care provider as planned or for acute medical concerns.  4. Call the psychiatric nurse line with medication questions or concerns at [phone] or [phone].  5. [employer] may be used to communicate with your care team, but this is not intended to be used for emergencies.    CRISIS RESOURCES:    1. Present to the Emergency Department as needed or call after hours crisis line at [phone] or [phone].   2. [place] Crisis Text Line: Text [place] to  545069.  3. Suicide LifeLine Chat: suicidepreJenn Rykertline.org/chat/.  4. National Suicide Prevention Lifeline: 249.582.2660 (TTY: 223.954.1595). Call anytime for help.  (www.suicidepreventionlifeline.org)  5. National [place] on Mental Illness (www.cesar.org): 309-612-4204 or 810-269-4088.  6. Mental Health Association (www.mentalhealth.org): 634.851.8785 or 772-594-7384.    ADMINISTRATIVE BILLING:    Time spent interviewing patient, reviewing referral documents, obtaining and reviewing outside records, communication with other health specialists, and preparing this report on today's date  Video/Phone Start Time: 1202  Video/Phone End Time: 1227    Signed:   Deisy Terrell DNP, [id number]  [facility] ([facility])

## 2022-12-01 ENCOUNTER — VIRTUAL VISIT (OUTPATIENT)
Dept: BEHAVIORAL HEALTH | Facility: CLINIC | Age: 32
End: 2022-12-01
Payer: COMMERCIAL

## 2022-12-01 ENCOUNTER — VIRTUAL VISIT (OUTPATIENT)
Dept: PSYCHIATRY | Facility: CLINIC | Age: 32
End: 2022-12-01
Payer: COMMERCIAL

## 2022-12-01 DIAGNOSIS — F90.0 ADHD (ATTENTION DEFICIT HYPERACTIVITY DISORDER), INATTENTIVE TYPE: ICD-10-CM

## 2022-12-01 DIAGNOSIS — F41.1 GENERALIZED ANXIETY DISORDER: Primary | ICD-10-CM

## 2022-12-01 DIAGNOSIS — F41.9 ANXIETY: ICD-10-CM

## 2022-12-01 DIAGNOSIS — F33.0 MILD EPISODE OF RECURRENT MAJOR DEPRESSIVE DISORDER (H): ICD-10-CM

## 2022-12-01 DIAGNOSIS — F90.0 ADHD (ATTENTION DEFICIT HYPERACTIVITY DISORDER), INATTENTIVE TYPE: Primary | ICD-10-CM

## 2022-12-01 PROCEDURE — 99214 OFFICE O/P EST MOD 30 MIN: CPT | Mod: GT | Performed by: STUDENT IN AN ORGANIZED HEALTH CARE EDUCATION/TRAINING PROGRAM

## 2022-12-01 PROCEDURE — 90832 PSYTX W PT 30 MINUTES: CPT | Mod: GT | Performed by: SOCIAL WORKER

## 2022-12-01 RX ORDER — METHYLPHENIDATE HYDROCHLORIDE 18 MG/1
18 TABLET ORAL EVERY MORNING
Qty: 21 TABLET | Refills: 0 | Status: SHIPPED | OUTPATIENT
Start: 2022-12-01 | End: 2022-12-20

## 2022-12-01 ASSESSMENT — PATIENT HEALTH QUESTIONNAIRE - PHQ9
10. IF YOU CHECKED OFF ANY PROBLEMS, HOW DIFFICULT HAVE THESE PROBLEMS MADE IT FOR YOU TO DO YOUR WORK, TAKE CARE OF THINGS AT HOME, OR GET ALONG WITH OTHER PEOPLE: VERY DIFFICULT
SUM OF ALL RESPONSES TO PHQ QUESTIONS 1-9: 8
10. IF YOU CHECKED OFF ANY PROBLEMS, HOW DIFFICULT HAVE THESE PROBLEMS MADE IT FOR YOU TO DO YOUR WORK, TAKE CARE OF THINGS AT HOME, OR GET ALONG WITH OTHER PEOPLE: VERY DIFFICULT
SUM OF ALL RESPONSES TO PHQ QUESTIONS 1-9: 8

## 2022-12-01 NOTE — PATIENT INSTRUCTIONS
Thank you for our time together today in [facility] ([facility] provides brief psychiatric medication stabilization to patients referred by their Primary Care Providers. Patients are typically seen in [facility] for up to 4 appointments and then referred back to the PCP for ongoing refills unless longer term medication management by a specialist is indicated. If I believe you will benefit from long-term psychiatric care I will discuss this with you. If you are interested in seeing a psychiatrist or psychiatric nurse practitioner long-term, please send me a message in [email or web address]. so we can refer you appropriately.     Treatment Plan:  Stop atomoxetine  Start Concerta 18mg daily for ADHD  Call [phone] to schedule follow up with me in 2-3 weeks.  You can call the above number to make appointments, leave a message with our nursing team, and inquire about any mental health referrals I have placed.

## 2022-12-01 NOTE — PROGRESS NOTES
[facility]  December 1, 2022      Behavioral Health Clinician Progress Note    Patient Name: Christiane Hernandez           Service Type:  Individual      Service Location:   [employer] / Email (patient reached)     Session Start Time: 11:31 am  Session End Time: 11:57 am      Session Length: 16 - 37      Attendees: Patient     Service Modality:  Video Visit:      Provider verified identity through the following two step process.  Patient provided:  Patient is known previously to provider and Patient was verified at admission/transfer    Telemedicine Visit: The patient's condition can be safely assessed and treated via synchronous audio and visual telemedicine encounter.      Reason for Telemedicine Visit: Services only offered telehealth    Originating Site (Patient Location): Patient's home    Distant Site (Provider Location): Provider Remote Setting- Home Office    Consent:  The patient/guardian has verbally consented to: the potential risks and benefits of telemedicine (video visit) versus in person care; bill my insurance or make self-payment for services provided; and responsibility for payment of non-covered services.     Patient would like the video invitation sent by:  My Chart    Mode of Communication:  Video Conference via [facility]    Distant Location (Provider):  Off-site    As the provider I attest to compliance with applicable laws and regulations related to telemedicine.    Visit Activities (Refresh list every visit): [facility] Only    Diagnostic Assessment Date: 10/13/2022  Treatment Plan Review Date: Due by 03/01/2022  See Flowsheets for today's PHQ-9 and ABIDA-7 results  Previous PHQ-9:   PHQ-9 SCORE 10/12/2022 12/1/2022 12/1/2022   PHQ-9 Total Score MyChart 9 (Mild depression) - 8 (Mild depression)   PHQ-9 Total Score 9 8 8     Previous ABIDA-7:   ABIDA-7 SCORE 8/25/2020 10/12/2022 10/12/2022   Total Score - - 4 (minimal anxiety)   Total Score 14 4 4       CARLTON  "LEVEL:  No flowsheet data found.    DATA  Extended Session (60+ minutes): No  Interactive Complexity: No  Crisis: No  [facility] Patient: No    Treatment Objective(s) Addressed in This Session:  Target Behavior(s): Improving focus    Attention Problems: will develop coping skills to effectively manage attention issues    Current Stressors / Issues:   Update: \"ok.\" Not noticing any significant changes other than side effects. Not able to start for the first couple of weeks but eventually started in the evening. Able to fall asleep but then wake up a few hours later and feel restless and not get back to sleep. Started to taking in the morning and started feeling jittery and need to take with food. Experiences stiffness in neck and body still. Is having \"bursts\" of focus but not really feeling overall focused or productive. Starts day with feeling overwhelmed with work and what needs to be done. Mood has been stable but does feel more tired during the evenings after working all day. Could be seasonal. Deleted Instagram and helping to control impulsive social media binges and shopping.  [facility] praised patient for her progress and insight.  [facility] discussed patient using behavioral techniques by establishing a routine that helps her prepare for work and getting into the work mindset and setting up a more formal dedicated work space.  [facility] discussed the importance and finding separation between work and home.  [facility] praised patient for stepping back from social media and trying to avoid addictive behaviors at this time.  [facility] provided encouragement that patient may need to allow more time as she continues to work on her medication and behavioral techniques.    Stressors: getting darker sooner, holidays can be stressful with past history and family expectations    Tx: Dominique Moreno at [facility] [employer] [facility] [employer]. Has been seeing every other week or every three weeks for maintenance. Going well.    Etoh: not often, couple times a " month  Substance: cannabis gummies a couple times a month  Nicotine: denies, non smoker  Caffeine: 1 green tea a day    Most Important: increase dose, keep trying and give it more time? Change medication?    Progress on Treatment Objective(s) / Homework:  New Objective established this session - PREPARATION (Decided to change - considering how); Intervened by negotiating a change plan and determining options / strategies for behavior change, identifying triggers, exploring social supports, and working towards setting a date to begin behavior change    Motivational Interviewing    MI Intervention: Co-Developed Goal: Improving focus, Expressed Empathy/Understanding, Open-ended questions, Reflections: simple and complex and Change talk (evoked)     Change Talk Expressed by the Patient: Desire to change Ability to change Reasons to change Need to change    Provider Response to Change Talk: E - Evoked more info from patient about behavior change, A - Affirmed patient's thoughts, decisions, or attempts at behavior change and R - Reflected patient's change talk    Also provided psychoeducation about behavioral health condition, symptoms, and treatment options    Care Plan review completed: Yes    Medication Review:  Changes to psychiatric medications, see updated Medication List in EPIC.     Medication Compliance:  Yes    Changes in Health Issues:   None reported    Chemical Use Review:   Substance Use: Chemical use reviewed, no active concerns identified      Tobacco Use: No current tobacco use.      Assessment: Current Emotional / Mental Status (status of significant symptoms):  Risk status (Self / Other harm or suicidal ideation)  Patient denies a history of suicidal ideation, suicide attempts, self-injurious behavior, homicidal ideation, homicidal behavior and and other safety concerns  Patient denies current fears or concerns for personal safety.  Patient denies current or recent suicidal ideation or  behaviors.  Patient denies current or recent homicidal ideation or behaviors.  Patient denies current or recent self injurious behavior or ideation.  Patient denies other safety concerns.  A safety and risk management plan has not been developed at this time, however patient was encouraged to call [place] [phone] should there be a change in any of these risk factors.    Appearance:   Appropriate   Eye Contact:   Good   Psychomotor Behavior: Normal   Attitude:   Cooperative  Interested  Orientation:   All  Speech   Rate / Production: Normal    Volume:  Normal   Mood:    Anxious   Affect:    Appropriate   Thought Content:  Clear   Thought Form:  Coherent  Logical   Insight:    Good     Diagnoses:  1. Generalized anxiety disorder    2. Mild episode of recurrent major depressive disorder (H)    3. ADHD (attention deficit hyperactivity disorder), inattentive type        Collateral Reports Completed:  Communicated with: [facility] Team    Plan: (Homework, other):  Patient was given information about behavioral services and encouraged to schedule a follow up appointment with the clinic [facility] as needed.  She was also given information about mental health symptoms and treatment options .  CD Recommendations: No indications of CD issues.     ______________________________________________________________________    Integrated Primary Care Behavioral Health Treatment Plan    Patient's Name: Christiane Hernandez  YOB: 1990    Date of Creation: 12/01/2022  Date Treatment Plan Last Reviewed/Revised: Pending    DSM5 Diagnoses: Attention-Deficit/Hyperactivity Disorder  314.00 (F90.0) Predominantly inattentive presentation, 296.31 (F33.0) Major Depressive Disorder, Recurrent Episode, Mild _ or 300.02 (F41.1) Generalized Anxiety Disorder  Psychosocial / Contextual Factors: Patient lives with her partner.  Patient is working full-time from home.  Patient does have family and social supports.  PROMIS (reviewed every 90 days):  25    Referral / Collaboration:  Patient is connected with an individual therapist and declines any other needs at this time.    Anticipated number of session for this episode of care: 3-5 sessions  Anticipation frequency of session: Monthly  Anticipated Duration of each session: 16-37 minutes  Treatment plan will be reviewed in 90 days or when goals have been changed.       MeasurableTreatment Goal(s) related to diagnosis / functional impairment(s)  Goal 1: Patient will report increased attention and task completion, especially with work.    I will know I've met my goal when I am getting done what I need to.      Objective #A (Patient Action)    Patient will identify and use 2-3 strategies to improve organization.  Status: New - Date: 12/01/2022     Intervention(s)  Therapist will teach Skills to assist with ADHD needs.    Objective #B  Patient will use cognitive strategies identified in therapy to challenge anxious thoughts.  Status: New - Date: 12/01/2022     Intervention(s)  Therapist will assign homework For patient to focus on relaxation time and separation of work and home.      Patient has reviewed and agreed to the above plan.      DEZ Rizo  December 1, 2022    Answers for HPI/ROS submitted by the patient on 12/1/2022  If you checked off any problems, how difficult have these problems made it for you to do your work, take care of things at home, or get along with other people?: Very difficult  PHQ9 TOTAL SCORE: 8

## 2022-12-20 ENCOUNTER — VIRTUAL VISIT (OUTPATIENT)
Dept: PSYCHIATRY | Facility: CLINIC | Age: 32
End: 2022-12-20
Payer: COMMERCIAL

## 2022-12-20 ENCOUNTER — VIRTUAL VISIT (OUTPATIENT)
Dept: BEHAVIORAL HEALTH | Facility: CLINIC | Age: 32
End: 2022-12-20
Payer: COMMERCIAL

## 2022-12-20 DIAGNOSIS — F41.1 GAD (GENERALIZED ANXIETY DISORDER): ICD-10-CM

## 2022-12-20 DIAGNOSIS — F41.1 GENERALIZED ANXIETY DISORDER: Primary | ICD-10-CM

## 2022-12-20 DIAGNOSIS — F33.0 MILD EPISODE OF RECURRENT MAJOR DEPRESSIVE DISORDER (H): ICD-10-CM

## 2022-12-20 DIAGNOSIS — F90.0 ADHD (ATTENTION DEFICIT HYPERACTIVITY DISORDER), INATTENTIVE TYPE: Primary | ICD-10-CM

## 2022-12-20 DIAGNOSIS — F90.0 ADHD (ATTENTION DEFICIT HYPERACTIVITY DISORDER), INATTENTIVE TYPE: ICD-10-CM

## 2022-12-20 PROCEDURE — 99214 OFFICE O/P EST MOD 30 MIN: CPT | Mod: GT | Performed by: STUDENT IN AN ORGANIZED HEALTH CARE EDUCATION/TRAINING PROGRAM

## 2022-12-20 PROCEDURE — 90832 PSYTX W PT 30 MINUTES: CPT | Mod: GT | Performed by: SOCIAL WORKER

## 2022-12-20 RX ORDER — METHYLPHENIDATE HYDROCHLORIDE 18 MG/1
18 TABLET ORAL EVERY MORNING
Qty: 30 TABLET | Refills: 0 | Status: SHIPPED | OUTPATIENT
Start: 2022-12-20 | End: 2023-01-10

## 2022-12-20 NOTE — PATIENT INSTRUCTIONS
Thank you for our time together today in [facility] Service ([facility]). CCPS provides brief psychiatric medication stabilization to patients referred by their Primary Care Providers. Patients are typically seen in CCPS for up to 4 appointments and then referred back to the PCP for ongoing refills unless longer term medication management by a specialist is indicated. If I believe you will benefit from long-term psychiatric care I will discuss this with you. If you are interested in seeing a psychiatrist or psychiatric nurse practitioner long-term, please send me a message in [employer] so we can refer you appropriately.     Treatment Plan:  Continue Concerta 18mg daily for ADHD  Continue Feeling Rocky daily  OK to add Magnesium 200mg at bedtime for sleep and anxiety OR silexan 80mg daily for anxiety. Magnesium has more limited evidence for anxiety but there are some anecdotal reports that people find it helpful. Silexan can improve sleep and anxiety (so can magnesium) but it is not sedating so you can take it in the morning.   Silexan study: https://pubmed.ncbi.nlm.nih.gov/49289126/  Examine.com page for silexan: https://Entrada.com/summaries/study/Odn7g1/  [employer] page for magnesium: https://Entrada.com/supplements/magnesium/  Call [phone] to schedule follow up with me in 3-4 weeks.  You can call the above number to make appointments, leave a message with our nursing team, and inquire about any mental health referrals I have placed.    Happy holidays!  REJI Cherry  Collaborative Care Psychiatry Service  [facility]

## 2022-12-20 NOTE — PROGRESS NOTES
[facility] Psychiatry Services [facility]  December 20, 2022      Behavioral Health Clinician Progress Note    Patient Name: Christiane Hernandez           Service Type:  Individual      Service Location:   [employer] / Email (patient reached)     Session Start Time: 11:32  Session End Time: 11:59 am      Session Length: 16 - 37      Attendees: Patient     Service Modality:  Video Visit:      Provider verified identity through the following two step process.  Patient provided:  Patient is known previously to provider and Patient was verified at admission/transfer    Telemedicine Visit: The patient's condition can be safely assessed and treated via synchronous audio and visual telemedicine encounter.      Reason for Telemedicine Visit: Services only offered telehealth    Originating Site (Patient Location): Patient's home    Distant Site (Provider Location): Provider Remote Setting- Home Office    Consent:  The patient/guardian has verbally consented to: the potential risks and benefits of telemedicine (video visit) versus in person care; bill my insurance or make self-payment for services provided; and responsibility for payment of non-covered services.     Patient would like the video invitation sent by:  My Chart    Mode of Communication:  Video Conference via [facility]    Distant Location (Provider):  Off-site    As the provider I attest to compliance with applicable laws and regulations related to telemedicine.    Visit Activities (Refresh list every visit): [facility] Only    Diagnostic Assessment Date: 10/13/2022  Treatment Plan Review Date: Due by 03/01/2022  See Flowsheets for today's PHQ-9 and ABIDA-7 results  Previous PHQ-9:   PHQ-9 SCORE 10/12/2022 12/1/2022 12/1/2022   PHQ-9 Total Score MyChart 9 (Mild depression) - 8 (Mild depression)   PHQ-9 Total Score 9 8 8     Previous ABIDA-7:   ABIDA-7 SCORE 8/25/2020 10/12/2022 10/12/2022   Total Score - - 4 (minimal anxiety)   Total Score 14 4 4       CARL[place]  "LEVEL:  No flowsheet data found.    DATA  Extended Session (60+ minutes): No  Interactive Complexity: No  Crisis: No  [facility] Patient: No    Treatment Objective(s) Addressed in This Session:  Target Behavior(s): Improving focus    Attention Problems: will develop coping skills to effectively manage attention issues    Current Stressors / Issues:   Update: \"ok.\" Biggest change is that she is not \"crashing\" as she did before.  Patient started taking her medication earlier in the morning so that she does feel tired and is able to go to bed at night.  She denies it is impacting her sleep and she does start to feel tired around 9 PM instead of around 6 or 7 PM.  Patient finds that she has more energy in the evenings and is able to get more things done around the home and still have time for relaxation.  Patient also finds she becomes hyper focused when working and has a hard time stepping away from her work.  Patient admits that she is undergoing a trial and error to figure out new things for herself.   [facility] validated and normalized the experiences that patient is going through.  [facility] processed the before and the now, and patient being careful to not expect herself to be the same that she was before and instead be open to the changes she is experiencing now.  [facility] processed patient settling into a new routine and feeling comfortable with her changes.  [facility] reviewed skills for ADHD including patient setting a schedule and using timers to help her keep track of things.  [facility] encouraged patient to remember to take breaks throughout the day.  [facility] also assisted patient in identifying how a to do list versus scheduled reminders can be helpful to not ignore any important things.  [facility] normalized that we will continue to take time for patient to adjust.    Stressors: changes at work and adapting to needing to new expectatations but easier this past week. Regulating into winter and holidays can be stressful with past history and family " expectations    Tx: Dominique Moreno at [facility] [facility]. Has been seeing every other week or every three weeks for maintenance. Going well.    Etoh: not often, couple times a month  Substance: cannabis gummies a couple times a month  Nicotine: denies, non smoker  Caffeine: 1 green tea a day    Most Important: irritable the first few days, seems to be stabilizing    Progress on Treatment Objective(s) / Homework:  Satisfactory progress - ACTION (Actively working towards change); Intervened by reinforcing change plan / affirming steps taken    Motivational Interviewing    MI Intervention: Co-Developed Goal: Improving focus, Expressed Empathy/Understanding, Open-ended questions, Reflections: simple and complex and Change talk (evoked)     Change Talk Expressed by the Patient: Desire to change Ability to change Reasons to change Need to change Committment to change Activation Taking steps    Provider Response to Change Talk: E - Evoked more info from patient about behavior change, A - Affirmed patient's thoughts, decisions, or attempts at behavior change and R - Reflected patient's change talk    Also provided psychoeducation about behavioral health condition, symptoms, and treatment options    Care Plan review completed: No    Medication Review:  Changes to psychiatric medications, see updated Medication List in EPIC.     Medication Compliance:  Yes    Changes in Health Issues:   None reported    Chemical Use Review:   Substance Use: Chemical use reviewed, no active concerns identified      Tobacco Use: No current tobacco use.      Assessment: Current Emotional / Mental Status (status of significant symptoms):  Risk status (Self / Other harm or suicidal ideation)  Patient denies a history of suicidal ideation, suicide attempts, self-injurious behavior, homicidal ideation, homicidal behavior and and other safety concerns  Patient denies current fears or concerns for personal safety.  Patient denies current or  recent suicidal ideation or behaviors.  Patient denies current or recent homicidal ideation or behaviors.  Patient denies current or recent self injurious behavior or ideation.  Patient denies other safety concerns.  A safety and risk management plan has not been developed at this time, however patient was encouraged to call [place] [phone] should there be a change in any of these risk factors.    Appearance:   Appropriate   Eye Contact:   Good   Psychomotor Behavior: Normal   Attitude:   Cooperative  Interested  Orientation:   All  Speech   Rate / Production: Normal    Volume:  Normal   Mood:    Anxious   Affect:    Appropriate   Thought Content:  Clear   Thought Form:  Coherent  Logical   Insight:    Good     Diagnoses:  1. Generalized anxiety disorder    2. ADHD (attention deficit hyperactivity disorder), inattentive type    3. Mild episode of recurrent major depressive disorder (H)        Collateral Reports Completed:  Communicated with: [facility] Team    Plan: (Homework, other):  Patient was given information about behavioral services and encouraged to schedule a follow up appointment with the clinic [facility] as needed.  She was also given information about mental health symptoms and treatment options .  CD Recommendations: No indications of CD issues.     ______________________________________________________________________    Integrated Primary Care Behavioral Health Treatment Plan    Patient's Name: Christiane Hernandez  YOB: 1990    Date of Creation: 12/01/2022  Date Treatment Plan Last Reviewed/Revised: Pending    DSM5 Diagnoses: Attention-Deficit/Hyperactivity Disorder  314.00 (F90.0) Predominantly inattentive presentation, 296.31 (F33.0) Major Depressive Disorder, Recurrent Episode, Mild _ or 300.02 (F41.1) Generalized Anxiety Disorder  Psychosocial / Contextual Factors: Patient lives with her partner.  Patient is working full-time from home.  Patient does have family and social supports.  Recite Me  (reviewed every 90 days): 25    Referral / Collaboration:  Patient is connected with an individual therapist and declines any other needs at this time.    Anticipated number of session for this episode of care: 3-5 sessions  Anticipation frequency of session: Monthly  Anticipated Duration of each session: 16-37 minutes  Treatment plan will be reviewed in 90 days or when goals have been changed.       MeasurableTreatment Goal(s) related to diagnosis / functional impairment(s)  Goal 1: Patient will report increased attention and task completion, especially with work.    I will know I've met my goal when I am getting done what I need to.      Objective #A (Patient Action)    Patient will identify and use 2-3 strategies to improve organization.  Status: New - Date: 12/01/2022     Intervention(s)  Therapist will teach Skills to assist with ADHD needs.    Objective #B  Patient will use cognitive strategies identified in therapy to challenge anxious thoughts.  Status: New - Date: 12/01/2022     Intervention(s)  Therapist will assign homework For patient to focus on relaxation time and separation of work and home.      Patient has reviewed and agreed to the above plan.      DEZ Rizo  December 20, 2022

## 2022-12-20 NOTE — PROGRESS NOTES
Christiane Hernandez  is being evaluated via a billable video visit.      How would you like to obtain your AVS? MyChar  For the video visit, send the invitation by: Send to e-mail at: [email or web address]  Will anyone else be joining your video visit? Luisana ELIZABETH

## 2022-12-20 NOTE — PROGRESS NOTES
"  [facility] PSYCHIATRY SERVICE FOLLOW-UP     Name:  Christiane Hernandez  : 1990     Telemedicine Visit: The patient's condition can be safely assessed and treated via synchronous audio and visual telemedicine encounter.      Reason for Telemedicine Visit: COVID 19 pandemic and the social and physical recommendations by the CDC and MDH.      Originating Site (Patient Location): Patient's home     Distant Site (Provider Location): Provider Remote Setting     Consent:  The patient/guardian has verbally consented to: the potential risks and benefits of telemedicine (video visit or phone) versus in person care; bill my insurance or make self-payment for services provided; and responsibility for payment of non-covered services.     Mode of Communication:  [email or web address] video platform      As the provider I attest to compliance with applicable laws and regulations related to telemedicine.    IDENTIFICATION     Patient is a 32 year old year old White, female  who [facility]  Patient was initially referred by their PCP. Patient attended the session alone.     Patient care team: Patient Care Team:  Eliane Shaver NP as PCP - General (Nurse Practitioner - Family)  Eliane Shaver NP as Assigned PCP  Radha Terrell NP as Assigned Neuroscience Provider    INTERIM HISTORY   Pt was last seen in [facility] for follow-up on 1 Dec 22. At that time, the plan included start methylphenidate CR 18mg.    Interim pt communication:  none    Available records were reviewed prior to visit.    HISTORY OF PRESENT ILLNESS     Per [facility] Emily Maddox during today's team-based visit: \"MH Update: \"ok.\" Biggest change is that she is not \"crashing\" as she did before. She is needing to take earlier so that she does get tired at night. Not impacting sleeping at night. Does start to feel tired around 9 pm instead of 6/7 pm. Also finds she becomes hyperfocused and has a hard time stepping away from " "work sometimes. Trial and error to figure things out.  Discussed adjusting to new routine and settling in. Using schedule and timers to help with tracking. Keeping the same schedule/routine. To-do lists. Organization.  Stressors: changes at work and adapting to needing to new expectatations but easier this past week. Regulating into winter and holidays can be stressful with past history and family expectations  Tx: Dominique Moreno at [facility]. Has been seeing every other week or every three weeks for maintenance. Going well.  Etoh: not often, couple times a month  Substance: cannabis gummies a couple times a month  Nicotine: denies, non smoker  Caffeine: 1 green tea a day  Most Important: irritable the first few days, seems to be stabilizing\"    ---Psychiatry Update---  Medicine update: Pt noticed irritability earlier in the evening after starting Concerta. Her partner was concerned about this change but pt has been trying to observe this and manage irritability before lashing out. She is noticing that she's able to better focus on tasks at work. She sometimes forgets to eat because of a low appetite. She thinks she still has some trouble with organizing her thoughts and still feels a bit distracted.   Pt still has trouble managing competing demands at work and trouble \"bouncing from one thing to the other\". She wonders if Concerta is worsening her anxiety. She doesn't want to medicate for anxiety but recognizes that sometimes it's high enough that it impairs her social/occ functioning.    Suicidal ideation:  No   PHQ2 score is 2 indicating minimal depression.  GAD2 score is 1 indicating minimal anxiety    Sleep: Pt has not experienced any problems with insomnia since starting Concerta. She thinks she is getting somewhere around 7-9h/night.   Appetite: Pt is having lower appetite with Concerta but no noticeable weight loss. It is not bothering her to take without food.     MEDICATIONS                  "                                                                             Current medications reviewed today and are noted below.   Current psychotropic medications:   Concerta 18mg     Past psychotropic medications:  Escitalopram - intolerable SEs  Bupropion  Atomoxetine  Vilazodone - worked well for anx/dep. Didn't feel emotionally numb.     Supplements:   See below       12/1/22 Concerta 18mg #21    Current Outpatient Medications   Medication Sig     IBUPROFEN PO Take by mouth as needed for moderate pain     methylphenidate HCl ER (CONCERTA) 18 MG CR tablet Take 1 tablet (18 mg) by mouth every morning     Multiple Vitamin (MULTI-VITAMIN DAILY PO) Take by mouth daily     norethindrone-ethinyl estradiol (MICROGESTIN 1/20) 1-20 MG-MCG tablet Take 1 tablet by mouth daily     valACYclovir (VALTREX) 1000 mg tablet Take 2 tablets twice daily as needed for cold sores     atomoxetine (STRATTERA) 25 MG capsule Take 2 capsules (50 mg) by mouth daily for 60 days     No current facility-administered medications for this visit.        VITALS   There were no vitals taken for this visit.    Pulse Readings from Last 5 Encounters:   04/12/22 65   02/08/21 64   08/25/20 64   02/13/20 72   05/09/19 75     Wt Readings from Last 5 Encounters:   04/12/22 72.1 kg (159 lb)   02/08/21 68.5 kg (151 lb)   08/25/20 64.4 kg (142 lb)   02/13/20 65.8 kg (145 lb)   05/09/19 61 kg (134 lb 6.4 oz)     BP Readings from Last 5 Encounters:   04/12/22 132/78   02/08/21 120/75   08/25/20 112/78   02/13/20 124/66   05/09/19 100/68       LABS & IMAGING                                                                                                                Recent available labs reviewed today.    Recent Labs   Lab Test 11/15/16  0000   CR 0.76   GFRESTIMATED 109     Recent Labs   Lab Test 11/15/16  0000   AST 13   ALT 12     Recent Labs   Lab Test 04/12/22  1643 11/15/16  0000   TSH 1.70 0.76       ALLERGY & IMMUNIZATIONS       Allergies    Allergen Reactions     Zithromax [Azithromycin]      Nausea and vomiting       MEDICAL & SURGICAL HISTORY    Reviewed past medical and surgical history today.   Pregnant - Denies.     Past Medical History:   Diagnosis Date     Irregular menses      Kidney stones      Mood disorder (H)     anxiety, depression, possible BAD     PMDD (premenstrual dysphoric disorder)        MENTAL STATUS EXAM:     Alertness: alert  and oriented  Appearance: adequately groomed  Behavior/Demeanor: cooperative, pleasant and calm, with good eye contact   Speech: normal and regular rate and rhythm  Language: intact and no problems  Psychomotor: normal or unremarkable  Mood: euthymia  Affect: full range and appropriate; was congruent to mood; was congruent to content  Thought Process/Associations: unremarkable  Thought Content:  Reports none;  Denies suicidal ideation, violent ideation and delusions  Perception:  Reports none;  Denies auditory hallucinations and visual hallucinations  Insight: intact  Judgment: intact  Cognition: does  appear grossly intact; formal cognitive testing was not done  Gait and Station: FRAN     RISK AND PROTECTIVE FACTORS     Static Risk Factors: prior attempts, history of MH diagnoses and/or treatment and impulsivity  Firearms/Weapons Access: Yes Locked up  Dynamic Risk Factors: emotional distress, substance use, anxiety, access to means and mental health stigma     Protective Factors: hope for the future, compliance with medication, problem solving ability, future oriented, healthy intimate relationships, engaged in EBT, perceived internal locus of control, access to care as needed and displaying help seeking behavior     SAFETY ASSESSMENT      Based on review of above risk and protective factors and today's exam, pt is not at elevated risk of harm to self or others. She does not meet criteria for a 72 hr hold and remains appropriate for ongoing outpatient care. The patient convincingly denies suicidality  today. There was no deceit detected, and the patient presented in a manner that was believable. Local community safety resources printed and reviewed for patient to use if needed.     Recommended that patient call 911 or go to the local ED should there be a change in any of these risk factors.     DSM 5 DIAGNOSIS      Attention-Deficit/Hyperactivity Disorder  314.00 (F90.0) Predominantly inattentive presentation  300.02 (F41.1) Generalized Anxiety Disorder     DIFFERENTIAL DIAGNOSIS: r/o BAD (type I vs II vs cyclothymia)     Medical comorbidities impacting or contributing to clinical picture: None noted  Known issue that I take into account for their medical decisions, no current exacerbations or new concerns.     ASSESSMENT AND PLAN      ASSESSMENT:  Christiane Hernandez is a 32 year old White, female who presents for return visit with [facility] ([facility]) for medication management.   1 Dec 22: Pt with hx of ABIDA, ADHD, and Bipolar Disorder type I per psych testing who returns reporting no improvement in ADHD sx since starting atomoxetine, and c/ muscle stiffness and restlessness since initiation. We discussed that this is not likely to be an effective medication for her ADHD long term based on the past 4-6 weeks so I advised stopping. I did review stimulants as first line options for ADHD in adults and reviewed my rec for methylphenidate trial. We reviewed r/b/se and pt is agreeable to trial of Concerta. I did again review risks of hypomania though I am still skeptical of her BAD dx. Pt denies safety concerns and is agreeable to f/up in a few weeks for close re-eval.   Today 20 Dec 22: Pt with hx of ABIDA, ADHD, and Bipolar Disorder type I per psych testing who returns reporting improvement in ADHD sx since adding Concerta. She still struggles with some disorganization and distractibility. It is possible Concerta is causing mild increase in anxiety and causing low appetite. Recommended no change  today and to watch and wait. Pt agrees. She does discuss her anxiety hx and verbalizes not wanting to take another medicine for this, but is interested in nutraceuticals. We discussed evidence for magnesium (more limited) or silexan for anxiety. Provided her information about both and suggested she consider one or the other over the next month before we re-eval sx response. Pt agrees. No safety concerns.     TREATMENT PLAN: Medication side effects and alternatives reviewed. Health promotion activities recommended and reviewed. All questions addressed. Education and counseling completed regarding risks and benefits of medications and psychotherapy options. [facility] model reviewed today. Recommend therapy for additional support. Safety plan reviewed as indicated.     MEDICATIONS:   -continue CONCERTA 18mg daily   -continue FEELING MAHAD supplement (contains ean, l-theonine, magnesium)  -start either a. Magnesium 200mg nightly or 80mg silexan daily for anxiety supplementation    LABS/RADS:   -None at this time    PATIENT STATUS:  [facility] MD/DO/NP/PA providers offer care a specialty service for Primary Care Providers in the [facility] that seek to optimize psychotropic medications for unstable patients.  Once medications have been optimized, our providers discharge the patient back to the referring Primary Care Provider for ongoing medication management.  This type of system allows our providers to serve a high volume of patients.   -Pt will be seen for continued medication stabilization in [facility].    PSYCHOSOCIAL:   -continue therapy  -Follow up with primary care provider as planned or for acute medical concerns.    PSYCHOEDUCATION:  Medication side effects and alternatives reviewed. Health promotion activities recommended and reviewed today. All questions addressed. Education and counseling completed regarding risks and benefits of medications and psychotherapy options.  Consent provided by  patient/guardian  Call the psychiatric nurse line with medication questions or concerns at [phone].  MyChart may be used to communicate with your provider, but this is not intended to be used for emergencies.  STIMULANT THERAPY: Side effects discussed including but not limited to cardiac (including HTN, tachycardia, sudden death), motor/tic, appetite/growth, mood lability and sleep disruption. This is a controlled substance with risk for abuse, need to keep in a safe keep place and cannot replace lost scripts  Medlineplus.gov is information for patients.  It is run by the [employer] [place] and it contains information about all disorders, diseases and all medications.      FOLLOW-UP: Follow up in 3-4 weeks    1. Continue all other treatments (including medications) per primary care provider and/or specialists.   2. To schedule individual or family therapy, call [facility] at [phone].   3. Follow up with primary care provider as planned or for acute medical concerns.  4. Call the psychiatric nurse line with medication questions or concerns at [phone] or [phone].  5. MyChart may be used to communicate with your care team, but this is not intended to be used for emergencies.    CRISIS RESOURCES:    1. Present to the Emergency Department as needed or call after hours crisis line at [phone] or [phone].   2. [place] Crisis Text Line: Text [place] to 759753.  3. Suicide LifeLine Chat: suicidepreventionlifeline.org/chat/.  4. National Suicide Prevention Lifeline: 944.284.9908 (TTY: 443.930.3048). Call anytime for help.  (www.suicidepreventionlifeline.org)  5. National [place] on Mental Illness (www.cesar.org): 506-777-2635 or 434-489-8445.  6. Mental Health Association (www.mentalhealth.org): 231.989.4902 or 965-564-8563.    ADMINISTRATIVE BILLING:    Time spent interviewing patient, reviewing referral documents, obtaining and reviewing outside records, communication  with other health specialists, and preparing this report on today's date  Video/Phone Start Time: 1200  Video/Phone End Time: 1230    Signed:   Deisy Terrell DNP, [id number]  [facility] ([facility])

## 2023-01-09 ASSESSMENT — PATIENT HEALTH QUESTIONNAIRE - PHQ9
SUM OF ALL RESPONSES TO PHQ QUESTIONS 1-9: 9
10. IF YOU CHECKED OFF ANY PROBLEMS, HOW DIFFICULT HAVE THESE PROBLEMS MADE IT FOR YOU TO DO YOUR WORK, TAKE CARE OF THINGS AT HOME, OR GET ALONG WITH OTHER PEOPLE: SOMEWHAT DIFFICULT
SUM OF ALL RESPONSES TO PHQ QUESTIONS 1-9: 9
10. IF YOU CHECKED OFF ANY PROBLEMS, HOW DIFFICULT HAVE THESE PROBLEMS MADE IT FOR YOU TO DO YOUR WORK, TAKE CARE OF THINGS AT HOME, OR GET ALONG WITH OTHER PEOPLE: SOMEWHAT DIFFICULT

## 2023-01-10 ENCOUNTER — VIRTUAL VISIT (OUTPATIENT)
Dept: BEHAVIORAL HEALTH | Facility: CLINIC | Age: 33
End: 2023-01-10
Payer: COMMERCIAL

## 2023-01-10 ENCOUNTER — VIRTUAL VISIT (OUTPATIENT)
Dept: PSYCHIATRY | Facility: CLINIC | Age: 33
End: 2023-01-10
Payer: COMMERCIAL

## 2023-01-10 DIAGNOSIS — F41.1 GAD (GENERALIZED ANXIETY DISORDER): ICD-10-CM

## 2023-01-10 DIAGNOSIS — F33.0 MILD EPISODE OF RECURRENT MAJOR DEPRESSIVE DISORDER (H): ICD-10-CM

## 2023-01-10 DIAGNOSIS — F90.0 ADHD (ATTENTION DEFICIT HYPERACTIVITY DISORDER), INATTENTIVE TYPE: ICD-10-CM

## 2023-01-10 DIAGNOSIS — F41.1 GENERALIZED ANXIETY DISORDER: Primary | ICD-10-CM

## 2023-01-10 DIAGNOSIS — F90.0 ADHD (ATTENTION DEFICIT HYPERACTIVITY DISORDER), INATTENTIVE TYPE: Primary | ICD-10-CM

## 2023-01-10 PROCEDURE — 90832 PSYTX W PT 30 MINUTES: CPT | Mod: GT

## 2023-01-10 PROCEDURE — 99214 OFFICE O/P EST MOD 30 MIN: CPT | Mod: GT | Performed by: STUDENT IN AN ORGANIZED HEALTH CARE EDUCATION/TRAINING PROGRAM

## 2023-01-10 RX ORDER — METHYLPHENIDATE HYDROCHLORIDE 27 MG/1
27 TABLET ORAL EVERY MORNING
Qty: 21 TABLET | Refills: 0 | Status: SHIPPED | OUTPATIENT
Start: 2023-01-10 | End: 2023-01-30

## 2023-01-10 NOTE — PROGRESS NOTES
Christiane Hernandez  is being evaluated via a billable video visit.      How would you like to obtain your AVS? MyChar  For the video visit, send the invitation by: Send to e-mail at: [email or web address]  Will anyone else be joining your video visit? Luisana ELIZABETH

## 2023-01-10 NOTE — PROGRESS NOTES
"  [facility] PSYCHIATRY SERVICE FOLLOW-UP     Name:  Christiane Hernandez  : 1990     Telemedicine Visit: The patient's condition can be safely assessed and treated via synchronous audio and visual telemedicine encounter.      Reason for Telemedicine Visit: COVID 19 pandemic and the social and physical recommendations by the CDC and [facility].      Originating Site (Patient Location): Patient's home     Distant Site (Provider Location): Provider Remote Setting     Consent:  The patient/guardian has verbally consented to: the potential risks and benefits of telemedicine (video visit or phone) versus in person care; bill my insurance or make self-payment for services provided; and responsibility for payment of non-covered services.     Mode of Communication:  [email or web address] video platform      As the provider I attest to compliance with applicable laws and regulations related to telemedicine.    IDENTIFICATION     Patient is a 32 year old year old White, female  who [facility]  Patient was initially referred by their PCP. Patient attended the session alone.     Patient care team: Patient Care Team:  Eliane Shaver NP as PCP - General (Nurse Practitioner - Family)  Eliane Shaver NP as Assigned PCP  Radha Terrell NP as Assigned Neuroscience Provider    INTERIM HISTORY   Pt was last seen in [facility] for follow-up on 20 Dec 22. At that time, the plan included continue Concerta 18mg, trial magnesium or silexan for anxiety.    Interim pt communication:  none    Available records were reviewed prior to visit.    HISTORY OF PRESENT ILLNESS     Per [facility] Lila Ibarra during today's team-based visit: \"MH Update: Pt states that her anxiety is still similar and unchanged due to last visit. Pt states that she is interested in supplements and starting this as she has not followed up on her recommendation since the last visit. In regards to behavior changes and modications, pt " "states that she has been using tools and skill set to help address her ADHD symptoms. Pt states that taking breaks from her computer and going outside as well as getting regular exercise is helpful. Pt states that this is her first time trying a stimulant and she feels that her body is adjusting.  [facility] talked about pt's successes and pt states that she has a difficult time reflecting on positive change. [facility] provided validation and emotional support. [facility] and pt discussed her work space and how she can make it more conducive to her ability to concentrate and focus.      Stressors: Pt reports that she is on a lower dose of stimulant and she states that she feels that the medication is helping, but questioning if she needs a increase to aid in her optimal level of functioning. Pt states that at times she has feelings of hopelessness and not being able to be successful in her career.      Tx: Dominique Moreno at [facility]. Has been seeing every other week or every three weeks for maintenance. Going well.     Etoh: not often, couple times a month  Substance: cannabis gummies a couple times a month  Nicotine: denies, non smoker  Caffeine: 1 green tea a day     Most important: Interested in a dose increase. Pt reports that she is taking dose significantly earlier in the day, as early as 5:20 in the morning. Pt states that this has been an improvement. Pt states that she is also taking it over the weekend as well. Interested in coaching for ADHD symptoms. \"    ---Psychiatry Update---  Pt notices herself feeling low self-esteem/self-worth possibly more since starting Concerta. \"Ok I have this tool that is supposed to help but it's not. Am I screwed forever?\"   She doesn't perceive that Concerta makes anxiety worse. \"I don't have crippling anxiety.\" Pt describes physical anxiety when she gets nervous. She is hyperaware of her rambling, trouble getting distracted/losing her train of thought.     Suicidal ideation:  " "No   PHQ9 score is 9 indicating mild depression.   GAD2 score is 1 indicating subthreshhold anxiety    Medication side effects: Pt without medication side effects. Pt is taking Concerta starting around 0520, shortly after she wakes up.     Sleep: Pt with ongoing stable sleep. She notices she is ready for bed around 1930/2000. She doesn't notice herself \"crashing\" with low energy in the afternoon.   Appetite: Concerta causes slightly lower appetite but no weight loss.     Medical: No new medical concerns.     MEDICATIONS                                                                                              Current medications reviewed today and are noted below.   Current psychotropic medications:   Concerta 18mg     Past psychotropic medications:  Escitalopram - intolerable SEs  Bupropion  Atomoxetine  Vilazodone - worked well for anx/dep. Didn't feel emotionally numb.     Supplements:   See below       12/23/22 Concerta 18mg #30  12/1/22 Concerta 18mg #21    Current Outpatient Medications   Medication Sig     IBUPROFEN PO Take by mouth as needed for moderate pain     methylphenidate HCl ER (CONCERTA) 18 MG CR tablet Take 1 tablet (18 mg) by mouth every morning     Multiple Vitamin (MULTI-VITAMIN DAILY PO) Take by mouth daily     norethindrone-ethinyl estradiol (MICROGESTIN 1/20) 1-20 MG-MCG tablet Take 1 tablet by mouth daily     valACYclovir (VALTREX) 1000 mg tablet Take 2 tablets twice daily as needed for cold sores     No current facility-administered medications for this visit.        VITALS   There were no vitals taken for this visit.    Pulse Readings from Last 5 Encounters:   04/12/22 65   02/08/21 64   08/25/20 64   02/13/20 72   05/09/19 75     Wt Readings from Last 5 Encounters:   04/12/22 72.1 kg (159 lb)   02/08/21 68.5 kg (151 lb)   08/25/20 64.4 kg (142 lb)   02/13/20 65.8 kg (145 lb)   05/09/19 61 kg (134 lb 6.4 oz)     BP Readings from Last 5 Encounters:   04/12/22 132/78   02/08/21 120/75 "   08/25/20 112/78   02/13/20 124/66   05/09/19 100/68       LABS & IMAGING                                                                                                                Recent available labs reviewed today.    Recent Labs   Lab Test 11/15/16  0000   CR 0.76   GFRESTIMATED 109     Recent Labs   Lab Test 11/15/16  0000   AST 13   ALT 12     Recent Labs   Lab Test 04/12/22  1643 11/15/16  0000   TSH 1.70 0.76       ALLERGY & IMMUNIZATIONS       Allergies   Allergen Reactions     Zithromax [Azithromycin]      Nausea and vomiting       MEDICAL & SURGICAL HISTORY    Reviewed past medical and surgical history today.   Pregnant - Denies.     Past Medical History:   Diagnosis Date     Irregular menses      Kidney stones      Mood disorder (H)     anxiety, depression, possible BAD     PMDD (premenstrual dysphoric disorder)        MENTAL STATUS EXAM:     Alertness: alert  and oriented  Appearance: adequately groomed  Behavior/Demeanor: cooperative, pleasant and calm, with good eye contact   Speech: normal and regular rate and rhythm  Language: intact and no problems  Psychomotor: normal or unremarkable  Mood: depressed  Affect: full range and appropriate; was congruent to mood; was congruent to content  Thought Process/Associations: unremarkable  Thought Content:  Reports none;  Denies suicidal ideation, violent ideation and delusions  Perception:  Reports none;  Denies auditory hallucinations and visual hallucinations  Insight: intact  Judgment: intact  Cognition: does  appear grossly intact; formal cognitive testing was not done  Gait and Station: FRAN     RISK AND PROTECTIVE FACTORS     Static Risk Factors: prior attempts, history of MH diagnoses and/or treatment and impulsivity  Firearms/Weapons Access: Yes Locked up  Dynamic Risk Factors: emotional distress, substance use, anxiety, access to means and mental health stigma     Protective Factors: hope for the future, compliance with medication, problem  solving ability, future oriented, healthy intimate relationships, engaged in EBT, perceived internal locus of control, access to care as needed and displaying help seeking behavior     SAFETY ASSESSMENT      Based on review of above risk and protective factors and today's exam, pt is not at elevated risk of harm to self or others. She does not meet criteria for a 72 hr hold and remains appropriate for ongoing outpatient care. The patient convincingly denies suicidality today. There was no deceit detected, and the patient presented in a manner that was believable. Local community safety resources printed and reviewed for patient to use if needed.     Recommended that patient call 911 or go to the local ED should there be a change in any of these risk factors.     DSM 5 DIAGNOSIS      Attention-Deficit/Hyperactivity Disorder  314.00 (F90.0) Predominantly inattentive presentation  300.02 (F41.1) Generalized Anxiety Disorder     DIFFERENTIAL DIAGNOSIS: r/o BAD (type I vs II vs cyclothymia)     Medical comorbidities impacting or contributing to clinical picture: None noted  Known issue that I take into account for their medical decisions, no current exacerbations or new concerns.    ASSESSMENT AND PLAN      ASSESSMENT:  Christiane Hernandez is a 32 year old White, female who presents for return visit with [facility] ([facility]) for medication management.   20 Dec 22: Pt with hx of ABIDA, ADHD, and Bipolar Disorder type I per psych testing who returns reporting improvement in ADHD sx since adding Concerta. She still struggles with some disorganization and distractibility. It is possible Concerta is causing mild increase in anxiety and causing low appetite. Recommended no change today and to watch and wait. Pt agrees. She does discuss her anxiety hx and verbalizes not wanting to take another medicine for this, but is interested in nutraceuticals. We discussed evidence for magnesium (more limited) or silexan  for anxiety. Provided her information about both and suggested she consider one or the other over the next month before we re-eval sx response. Pt agrees. No safety concerns.   Today 10 Ar 22: Pt with hx of ABIDA, ADHD, and Bipolar Disorder type I per psych testing who returns to clinic feeling defeated about her functional abilities, especially at work. She perceives that she should be able to be more organized, task oriented, and be better able to manage multiple demands at work since starting Concerta but she isn't. We discussed likely need to increase the dose. She is agreeable to this and we reviewed r/b/se stimulants again today. Discussed checking in via [employer] in 2-3 weeks with f/u appt in 6 weeks due to finances; pt agrees. I recommended she pursue ADHD coaching/therapy as her self-worth is very closely tied with her ability to be successful at work/perform as she wants/needs to. Pt denies safety concerns.    TREATMENT PLAN: Medication side effects and alternatives reviewed. Health promotion activities recommended and reviewed. All questions addressed. Education and counseling completed regarding risks and benefits of medications and psychotherapy options. [facility] model reviewed today. Recommend therapy for additional support. Safety plan reviewed as indicated.     MEDICATIONS:   -increase CONCERTA to 27mg daily x 3 weeks; may consider increasing to 36mg if needed    LABS/RADS:   -None at this time    PATIENT STATUS:  [facility] MD/DO/NP/PA providers offer care a specialty service for Primary Care Providers in the [facility] that seek to optimize psychotropic medications for unstable patients.  Once medications have been optimized, our providers discharge the patient back to the referring Primary Care Provider for ongoing medication management.  This type of system allows our providers to serve a high volume of patients.   -Pt will be seen for continued medication stabilization  in [facility].    PSYCHOSOCIAL:   -continue therapy  -Follow up with primary care provider as planned or for acute medical concerns.    PSYCHOEDUCATION:  Medication side effects and alternatives reviewed. Health promotion activities recommended and reviewed today. All questions addressed. Education and counseling completed regarding risks and benefits of medications and psychotherapy options.  Consent provided by patient/guardian  Call the psychiatric nurse line with medication questions or concerns at [phone].  MyChart may be used to communicate with your provider, but this is not intended to be used for emergencies.  STIMULANT THERAPY: Side effects discussed including but not limited to cardiac (including HTN, tachycardia, sudden death), motor/tic, appetite/growth, mood lability and sleep disruption. This is a controlled substance with risk for abuse, need to keep in a safe keep place and cannot replace lost scripts  [employer] is information for patients.  It is run by the [employer] and it contains information about all disorders, diseases and all medications.      FOLLOW-UP: Follow up in 6 weeks    1. Continue all other treatments (including medications) per primary care provider and/or specialists.   2. To schedule individual or family therapy, call [facility] at [phone].   3. Follow up with primary care provider as planned or for acute medical concerns.  4. Call the psychiatric nurse line with medication questions or concerns at [phone] or [phone].  5. MyChart may be used to communicate with your care team, but this is not intended to be used for emergencies.    CRISIS RESOURCES:    1. Present to the Emergency Department as needed or call after hours crisis line at [phone] or [phone].   2. [place] Crisis Text Line: Text [place] to 784098.  3. Suicide LifeLine Chat: suicidepreventionlifeline.org/chat/.  4. National Suicide Prevention Lifeline:  612.780.7186 (TTY: 244.345.9354). Call anytime for help.  (www.suicidepreventionlifeline.org)  5. National [place] on Mental Illness (www.cesar.org): 186.524.6058 or 937-026-7809.  6. Mental Health Association (www.mentalhealth.org): 200.678.6285 or 788-927-9300.    ADMINISTRATIVE BILLING:    Time spent interviewing patient, reviewing referral documents, obtaining and reviewing outside records, communication with other health specialists, and preparing this report on today's date  Video/Phone Start Time: 1358  Video/Phone End Time: 1419    Signed:   Deisy Terrell DNP, [id number]  [facility] ([facility])

## 2023-01-10 NOTE — PATIENT INSTRUCTIONS
Thank you for our time together today in [facility] provides brief psychiatric medication stabilization to patients referred by their Primary Care Providers. Patients are typically seen in [facility] for up to 4 appointments and then referred back to the PCP for ongoing refills unless longer term medication management by a specialist is indicated. If I believe you will benefit from long-term psychiatric care I will discuss this with you. If you are interested in seeing a psychiatrist or psychiatric nurse practitioner long-term, please send me a message in [facility] so we can refer you appropriately.     Treatment Plan:  Increase Concerta to 27mg daily for ADHD  Pursue ADHD coaching/specific therapy  Check in with me by message in 2-3 weeks. Call [phone] to schedule follow up with me in 6 weeks.  You can call the above number to make appointments, leave a message with our nursing team, and inquire about any mental health referrals I have placed.

## 2023-01-10 NOTE — PROGRESS NOTES
[facility]  January 10th, 2023       Behavioral Health Clinician Progress Note    Patient Name: Christiane Hernandez           Service Type:  Individual      Service Location:   MyChart / Email (patient reached)     Session Start Time: 1:30 PM  Session End Time: 1:50  PM      Session Length: 16 - 37      Attendees: Patient     Service Modality:  Video Visit:      Provider verified identity through the following two step process.  Patient provided:  Patient is known previously to provider and Patient was verified at admission/transfer    Telemedicine Visit: The patient's condition can be safely assessed and treated via synchronous audio and visual telemedicine encounter.      Reason for Telemedicine Visit: Services only offered telehealth    Originating Site (Patient Location): Patient's home    Distant Site (Provider Location): Provider Remote Setting- Home Office    Consent:  The patient/guardian has verbally consented to: the potential risks and benefits of telemedicine (video visit) versus in person care; bill my insurance or make self-payment for services provided; and responsibility for payment of non-covered services.     Patient would like the video invitation sent by:  My Chart    Mode of Communication:  Video Conference via Amwell    Distant Location (Provider):  Off-site    As the provider I attest to compliance with applicable laws and regulations related to telemedicine.    Visit Activities (Refresh list every visit): [facility] Only    Diagnostic Assessment Date: 10/13/2022  Treatment Plan Review Date: Due by 03/01/2022  See Flowsheets for today's PHQ-9 and ABIDA-7 results  Previous PHQ-9:   PHQ-9 SCORE 12/1/2022 1/9/2023 1/9/2023   PHQ-9 Total Score [facility] 8 (Mild depression) - 9 (Mild depression)   PHQ-9 Total Score 8 9 9     Previous ABIDA-7:   ABIDA-7 SCORE 8/25/2020 10/12/2022 10/12/2022   Total Score - - 4 (minimal anxiety)   Total Score 14 4 4       [place]  LEVEL:  No flowsheet data found.    DATA  Extended Session (60+ minutes): No  Interactive Complexity: No  Crisis: No  [facility] Patient: No    Treatment Objective(s) Addressed in This Session:  Target Behavior(s): Improving focus    Attention Problems: will develop coping skills to effectively manage attention issues    Current Stressors / Issues:     Update: Pt states that her anxiety is still similar and unchanged due to last visit. Pt states that she is interested in supplements and starting this as she has not followed up on her recommendation since the last visit. In regards to behavior changes and modications, pt states that she has been using tools and skill set to help address her ADHD symptoms. Pt states that taking breaks from her computer and going outside as well as getting regular exercise is helpful. Pt states that this is her first time trying a stimulant and she feels that her body is adjusting.  [facility] talked about pt's successes and pt states that she has a difficult time reflecting on positive change. [facility] provided validation and emotional support. [facility] and pt discussed her work space and how she can make it more conducive to her ability to concentrate and focus.     Stressors: Pt reports that she is on a lower dose of stimulant and she states that she feels that the medication is helping, but questioning if she needs a increase to aid in her optimal level of functioning. Pt states that at times she has feelings of hopelessness and not being able to be successful in her career.     Tx: Dominique Moreno at [facility]. Has been seeing every other week or every three weeks for maintenance. Going well.    Etoh: not often, couple times a month  Substance: cannabis gummies a couple times a month  Nicotine: denies, non smoker  Caffeine: 1 green tea a day    Most important: Interested in a dose increase. Pt reports that she is taking dose significantly earlier in the day, as early as 5:20 in the  morning. Pt states that this has been an improvement. Pt states that she is also taking it over the weekend as well. Interested in coaching for ADHD symptoms.       Progress on Treatment Objective(s) / Homework:  Satisfactory progress - ACTION (Actively working towards change); Intervened by reinforcing change plan / affirming steps taken    Motivational Interviewing    MI Intervention: Co-Developed Goal: Improving focus, Expressed Empathy/Understanding, Open-ended questions, Reflections: simple and complex and Change talk (evoked)     Change Talk Expressed by the Patient: Desire to change Ability to change Reasons to change Need to change Committment to change Activation Taking steps    Provider Response to Change Talk: E - Evoked more info from patient about behavior change, A - Affirmed patient's thoughts, decisions, or attempts at behavior change and R - Reflected patient's change talk    Also provided psychoeducation about behavioral health condition, symptoms, and treatment options    Care Plan review completed: No    Medication Review:  Changes to psychiatric medications, see updated Medication List in EPIC.     Medication Compliance:  Yes    Changes in Health Issues:   None reported    Chemical Use Review:   Substance Use: Chemical use reviewed, no active concerns identified      Tobacco Use: No current tobacco use.      Assessment: Current Emotional / Mental Status (status of significant symptoms):  Risk status (Self / Other harm or suicidal ideation)  Patient denies a history of suicidal ideation, suicide attempts, self-injurious behavior, homicidal ideation, homicidal behavior and and other safety concerns  Patient denies current fears or concerns for personal safety.  Patient denies current or recent suicidal ideation or behaviors.  Patient denies current or recent homicidal ideation or behaviors.  Patient denies current or recent self injurious behavior or ideation.  Patient denies other safety  concerns.  A safety and risk management plan has not been developed at this time, however patient was encouraged to call [place] [phone] should there be a change in any of these risk factors.    Appearance:   Appropriate   Eye Contact:   Good   Psychomotor Behavior: Normal   Attitude:   Cooperative  Interested  Orientation:   All  Speech   Rate / Production: Normal    Volume:  Normal   Mood:    Anxious   Affect:    Appropriate   Thought Content:  Clear   Thought Form:  Coherent  Logical   Insight:    Good     Diagnoses:  1. Generalized anxiety disorder    2. ADHD (attention deficit hyperactivity disorder), inattentive type    3. Mild episode of recurrent major depressive disorder (H)        Collateral Reports Completed:  Communicated with: CCPS Team    Plan: (Homework, other):  Patient was given information about behavioral services and encouraged to schedule a follow up appointment with the clinic [facility] as needed.  She was also given information about mental health symptoms and treatment options .  CD Recommendations: No indications of CD issues.     ______________________________________________________________________    Integrated Primary Care Behavioral Health Treatment Plan    Patient's Name: Christiane Hernandez  YOB: 1990    Date of Creation: 12/01/2022  Date Treatment Plan Last Reviewed/Revised: Pending    DSM5 Diagnoses: Attention-Deficit/Hyperactivity Disorder  314.00 (F90.0) Predominantly inattentive presentation, 296.31 (F33.0) Major Depressive Disorder, Recurrent Episode, Mild _ or 300.02 (F41.1) Generalized Anxiety Disorder  Psychosocial / Contextual Factors: Patient lives with her partner.  Patient is working full-time from home.  Patient does have family and social supports.  PROMIS (reviewed every 90 days): 25    Referral / Collaboration:  Patient is connected with an individual therapist and declines any other needs at this time.    Anticipated number of session for this episode of  care: 3-5 sessions  Anticipation frequency of session: Monthly  Anticipated Duration of each session: 16-37 minutes  Treatment plan will be reviewed in 90 days or when goals have been changed.       MeasurableTreatment Goal(s) related to diagnosis / functional impairment(s)  Goal 1: Patient will report increased attention and task completion, especially with work.    I will know I've met my goal when I am getting done what I need to.      Objective #A (Patient Action)    Patient will identify and use 2-3 strategies to improve organization.  Status: New - Date: 2022     Intervention(s)  Therapist will teach Skills to assist with ADHD needs.    Objective #B  Patient will use cognitive strategies identified in therapy to challenge anxious thoughts.  Status: New - Date: 2022     Intervention(s)  Therapist will assign homework For patient to focus on relaxation time and separation of work and home.      Patient has reviewed and agreed to the above plan.      JENARO Chambers, [facility]  Behavioral Health Clinician  [facility], [address], [address], [place], [place], [address]  [phone]      2023

## 2023-01-30 ENCOUNTER — TELEPHONE (OUTPATIENT)
Dept: PSYCHIATRY | Facility: CLINIC | Age: 33
End: 2023-01-30
Payer: COMMERCIAL

## 2023-01-30 NOTE — TELEPHONE ENCOUNTER
Reason for call:  Other   Patient called regarding (reason for call): prescription  Additional comments: Pt's usual pharmacy is out of stock of her dose of Concerta, would like to speak with nurse about making substitution with different dose?    Phone number to reach patient:  Home number on file [phone] (home)    Best Time:  any    Can we leave a detailed message on this number?  YES    Travel screening: Not Applicable

## 2023-03-02 ASSESSMENT — ENCOUNTER SYMPTOMS
WEAKNESS: 0
HEARTBURN: 0
JOINT SWELLING: 0
EYE PAIN: 0
ABDOMINAL PAIN: 0
PARESTHESIAS: 0
SORE THROAT: 0
ARTHRALGIAS: 0
SHORTNESS OF BREATH: 0
CONSTIPATION: 0
HEADACHES: 0
NERVOUS/ANXIOUS: 1
DIZZINESS: 0
HEMATURIA: 0
FEVER: 0
HEMATOCHEZIA: 0
DIARRHEA: 0
COUGH: 0
NAUSEA: 0
DYSURIA: 0
PALPITATIONS: 1
CHILLS: 0
FREQUENCY: 0
MYALGIAS: 0
BREAST MASS: 1

## 2023-03-06 ENCOUNTER — OFFICE VISIT (OUTPATIENT)
Dept: FAMILY MEDICINE | Facility: CLINIC | Age: 33
End: 2023-03-06
Payer: COMMERCIAL

## 2023-03-06 VITALS
WEIGHT: 149 LBS | RESPIRATION RATE: 19 BRPM | BODY MASS INDEX: 23.39 KG/M2 | HEIGHT: 67 IN | DIASTOLIC BLOOD PRESSURE: 83 MMHG | TEMPERATURE: 98.2 F | SYSTOLIC BLOOD PRESSURE: 127 MMHG | HEART RATE: 70 BPM | OXYGEN SATURATION: 100 %

## 2023-03-06 DIAGNOSIS — Z30.41 ENCOUNTER FOR SURVEILLANCE OF CONTRACEPTIVE PILLS: ICD-10-CM

## 2023-03-06 DIAGNOSIS — Z00.00 ROUTINE GENERAL MEDICAL EXAMINATION AT A HEALTH CARE FACILITY: Primary | ICD-10-CM

## 2023-03-06 DIAGNOSIS — B00.1 COLD SORE: ICD-10-CM

## 2023-03-06 DIAGNOSIS — N64.89 BREAST ASYMMETRY: ICD-10-CM

## 2023-03-06 PROCEDURE — 99213 OFFICE O/P EST LOW 20 MIN: CPT | Mod: 25 | Performed by: NURSE PRACTITIONER

## 2023-03-06 PROCEDURE — 99395 PREV VISIT EST AGE 18-39: CPT | Mod: 25 | Performed by: NURSE PRACTITIONER

## 2023-03-06 RX ORDER — NORETHINDRONE ACETATE AND ETHINYL ESTRADIOL .02; 1 MG/1; MG/1
1 TABLET ORAL DAILY
Qty: 112 TABLET | Refills: 4 | Status: SHIPPED | OUTPATIENT
Start: 2023-03-06 | End: 2024-03-27

## 2023-03-06 RX ORDER — VALACYCLOVIR HYDROCHLORIDE 1 G/1
TABLET, FILM COATED ORAL
Qty: 30 TABLET | Refills: 12 | Status: SHIPPED | OUTPATIENT
Start: 2023-03-06 | End: 2023-03-12

## 2023-03-06 ASSESSMENT — ENCOUNTER SYMPTOMS
NAUSEA: 0
BREAST MASS: 1
DIARRHEA: 0
CONSTIPATION: 0
HEADACHES: 0
HEMATURIA: 0
DYSURIA: 0
FREQUENCY: 0
WEAKNESS: 0
HEMATOCHEZIA: 0
MYALGIAS: 0
FEVER: 0
ABDOMINAL PAIN: 0
HEARTBURN: 0
NERVOUS/ANXIOUS: 1
SORE THROAT: 0
COUGH: 0
JOINT SWELLING: 0
PARESTHESIAS: 0
DIZZINESS: 0
EYE PAIN: 0
ARTHRALGIAS: 0
PALPITATIONS: 1
SHORTNESS OF BREATH: 0
CHILLS: 0

## 2023-03-06 ASSESSMENT — PAIN SCALES - GENERAL: PAINLEVEL: NO PAIN (0)

## 2023-03-06 ASSESSMENT — PATIENT HEALTH QUESTIONNAIRE - PHQ9
10. IF YOU CHECKED OFF ANY PROBLEMS, HOW DIFFICULT HAVE THESE PROBLEMS MADE IT FOR YOU TO DO YOUR WORK, TAKE CARE OF THINGS AT HOME, OR GET ALONG WITH OTHER PEOPLE: SOMEWHAT DIFFICULT
SUM OF ALL RESPONSES TO PHQ QUESTIONS 1-9: 9
SUM OF ALL RESPONSES TO PHQ QUESTIONS 1-9: 9

## 2023-03-06 NOTE — PROGRESS NOTES
SUBJECTIVE:   CC: Christiane is an 32 year old who presents for preventive health visit.   Patient has been advised of split billing requirements and indicates understanding: Yes  Healthy Habits:     Getting at least 3 servings of Calcium per day:  Yes    Bi-annual eye exam:  NO    Dental care twice a year:  NO    Sleep apnea or symptoms of sleep apnea:  None    Diet:  Vegetarian/vegan    Frequency of exercise:  6-7 days/week    Duration of exercise:  45-60 minutes    Taking medications regularly:  Yes    Medication side effects:  Not applicable    PHQ-2 Total Score: 2    Additional concerns today:  No  She thinks she feels a possible lump in her right breast for 2 months.  It is not painful.  She denies any nipple discharge, skin changes.  She does have a family historyof breast cancer, BRCA testing was negative for her aunt.       Today's PHQ-2 Score:   PHQ-2 ( 1999 [employer]) 3/2/2023   Q1: Little interest or pleasure in doing things 1   Q2: Feeling down, depressed or hopeless 1   PHQ-2 Score 2   PHQ-2 Total Score (12-17 Years)- Positive if 3 or more points; Administer PHQ-A if positive -   Q1: Little interest or pleasure in doing things Several days   Q2: Feeling down, depressed or hopeless Several days   PHQ-2 Score 2           Social History     Tobacco Use     Smoking status: Never     Smokeless tobacco: Never   Substance Use Topics     Alcohol use: Yes     Comment: occasionally. less than 5 a week          Alcohol Use 3/6/2023   Prescreen: >3 drinks/day or >7 drinks/week? No   No flowsheet data found.    Reviewed orders with patient.  Reviewed health maintenance and updated orders accordingly - Yes      Breast Cancer Screening:    FHS-7:   Breast CA Risk Assessment (FHS-7) 4/12/2022 3/2/2023   Did any of your first-degree relatives have breast or ovarian cancer? Yes Yes   Did any of your relatives have bilateral breast cancer? Yes Unknown   Did any man in your family have breast cancer? Yes Yes   Did any woman in  your family have breast and ovarian cancer? Yes Yes   Did any woman in your family have breast cancer before age 50 y? Yes Yes   Do you have 2 or more relatives with breast and/or ovarian cancer? Yes Yes   Do you have 2 or more relatives with breast and/or bowel cancer? Yes Yes         Pertinent mammograms are reviewed under the imaging tab.    History of abnormal Pap smear: NO - age 30-65 PAP every 5 years with negative HPV co-testing recommended  PAP / HPV Latest Ref Rng & Units 4/12/2022 5/9/2019   PAP   Negative for Intraepithelial Lesion or Malignancy (NILM) -   PAP (Historical) - - NIL   HPV16 Negative Negative -   HPV18 Negative Negative -   HRHPV Negative Negative -     Reviewed and updated as needed this visit by clinical staff   Tobacco  Allergies  Meds  Problems  Med Hx  Surg Hx  Fam Hx          Reviewed and updated as needed this visit by Provider   Tobacco  Allergies  Meds  Problems  Med Hx  Surg Hx  Fam Hx             Review of Systems   Constitutional: Negative for chills and fever.   HENT: Negative for congestion, ear pain, hearing loss and sore throat.    Eyes: Negative for pain and visual disturbance.   Respiratory: Negative for cough and shortness of breath.    Cardiovascular: Positive for palpitations. Negative for chest pain and peripheral edema.   Gastrointestinal: Negative for abdominal pain, constipation, diarrhea, heartburn, hematochezia and nausea.   Breasts:  Positive for breast mass. Negative for tenderness and discharge.   Genitourinary: Negative for dysuria, frequency, genital sores, hematuria, pelvic pain, urgency, vaginal bleeding and vaginal discharge.   Musculoskeletal: Negative for arthralgias, joint swelling and myalgias.   Skin: Negative for rash.   Neurological: Negative for dizziness, weakness, headaches and paresthesias.   Psychiatric/Behavioral: Positive for mood changes. The patient is nervous/anxious.           OBJECTIVE:   /83 (BP Location: Right arm,  "Patient Position: Sitting, Cuff Size: Adult Regular)   Pulse 70   Temp 98.2  F (36.8  C) (Oral)   Resp 19   Ht 1.692 m (5' 6.6\")   Wt 67.6 kg (149 lb)   SpO2 100%   BMI 23.62 kg/m    Physical Exam  GENERAL: healthy, alert and no distress  EYES: Eyes grossly normal to inspection, PERRL and conjunctivae and sclerae normal  HENT: ear canals and TM's normal, nose and mouth without ulcers or lesions  NECK: no adenopathy, no asymmetry, masses, or scars and thyroid normal to palpation  RESP: lungs clear to auscultation - no rales, rhonchi or wheezes  BREAST: normal without masses, tenderness or nipple discharge, no palpable axillary masses or adenopathy and breast tissue in the outer quadrants is asymmetrical comparing both breasts, likely just more fibrocystic tissue right breast  CV: regular rate and rhythm, normal S1 S2, no S3 or S4, no murmur, click or rub, no peripheral edema and peripheral pulses strong  ABDOMEN: soft, nontender, no hepatosplenomegaly, no masses and bowel sounds normal  MS: no gross musculoskeletal defects noted, no edema  SKIN: no suspicious lesions or rashes  NEURO: Normal strength and tone, mentation intact and speech normal  PSYCH: mentation appears normal, affect normal/bright        ASSESSMENT/PLAN:   (Z00.00) Routine general medical examination at a health care facility  (primary encounter diagnosis)  Comment:   Plan:     (Z30.41) Encounter for surveillance of contraceptive pills  Comment:   Plan: norethindrone-ethinyl estradiol (MICROGESTIN         1/20) 1-20 MG-MCG tablet        The current medical regimen is effective;  continue present plan and medications.     (B00.1) Cold sore  Comment: stable  Plan: valACYclovir (VALTREX) 1000 mg tablet        The current medical regimen is effective;  continue present plan and medications.     (N64.89) Breast asymmetry  Comment:   Plan: MA Diagnostic Digital Right        Will refer to the breast center.             COUNSELING:  Reviewed " "preventive health counseling, as reflected in patient instructions      BMI:   Estimated body mass index is 23.62 kg/m  as calculated from the following:    Height as of this encounter: 1.692 m (5' 6.6\").    Weight as of this encounter: 67.6 kg (149 lb).         She reports that she has never smoked. She has never used smokeless tobacco.          Eliane Shaver NP  [facility]  Answers for HPI/ROS submitted by the patient on 3/6/2023  If you checked off any problems, how difficult have these problems made it for you to do your work, take care of things at home, or get along with other people?: Somewhat difficult  PHQ9 TOTAL SCORE: 9      "

## 2023-03-08 DIAGNOSIS — B00.1 COLD SORE: ICD-10-CM

## 2023-03-08 NOTE — TELEPHONE ENCOUNTER
[facility] calling.  [phone]  Option 1 then option 2.  Reference # [id number]    Valtrex rx issue.  1- what is the duration of use? For how long? They actually don't need this answered.    2- quantity clarification- should this be a quantity of 4?  If pt takes this for one day with each episode, the dispense should be 28,32, 40?  PLEASE CLARIFY.    Maybe send in a new rx, but we should also call them to get them to send out the correct dispense amount.    MARY Burden

## 2023-03-09 ENCOUNTER — VIRTUAL VISIT (OUTPATIENT)
Dept: BEHAVIORAL HEALTH | Facility: CLINIC | Age: 33
End: 2023-03-09
Payer: COMMERCIAL

## 2023-03-09 ENCOUNTER — MYC MEDICAL ADVICE (OUTPATIENT)
Dept: PSYCHIATRY | Facility: CLINIC | Age: 33
End: 2023-03-09
Payer: COMMERCIAL

## 2023-03-09 ENCOUNTER — VIRTUAL VISIT (OUTPATIENT)
Dept: PSYCHIATRY | Facility: CLINIC | Age: 33
End: 2023-03-09
Payer: COMMERCIAL

## 2023-03-09 DIAGNOSIS — F41.1 GAD (GENERALIZED ANXIETY DISORDER): ICD-10-CM

## 2023-03-09 DIAGNOSIS — F41.1 GENERALIZED ANXIETY DISORDER: ICD-10-CM

## 2023-03-09 DIAGNOSIS — F90.0 ADHD (ATTENTION DEFICIT HYPERACTIVITY DISORDER), INATTENTIVE TYPE: Primary | ICD-10-CM

## 2023-03-09 DIAGNOSIS — F90.0 ADHD (ATTENTION DEFICIT HYPERACTIVITY DISORDER), INATTENTIVE TYPE: ICD-10-CM

## 2023-03-09 DIAGNOSIS — F33.0 MILD EPISODE OF RECURRENT MAJOR DEPRESSIVE DISORDER (H): ICD-10-CM

## 2023-03-09 PROCEDURE — 90832 PSYTX W PT 30 MINUTES: CPT | Mod: VID | Performed by: SOCIAL WORKER

## 2023-03-09 PROCEDURE — 99214 OFFICE O/P EST MOD 30 MIN: CPT | Mod: VID | Performed by: STUDENT IN AN ORGANIZED HEALTH CARE EDUCATION/TRAINING PROGRAM

## 2023-03-09 RX ORDER — DEXTROAMPHETAMINE SACCHARATE, AMPHETAMINE ASPARTATE MONOHYDRATE, DEXTROAMPHETAMINE SULFATE AND AMPHETAMINE SULFATE 2.5; 2.5; 2.5; 2.5 MG/1; MG/1; MG/1; MG/1
10 CAPSULE, EXTENDED RELEASE ORAL DAILY
Qty: 30 CAPSULE | Refills: 0 | Status: SHIPPED | OUTPATIENT
Start: 2023-03-09 | End: 2023-03-10

## 2023-03-09 ASSESSMENT — PATIENT HEALTH QUESTIONNAIRE - PHQ9
SUM OF ALL RESPONSES TO PHQ QUESTIONS 1-9: 7
SUM OF ALL RESPONSES TO PHQ QUESTIONS 1-9: 7
10. IF YOU CHECKED OFF ANY PROBLEMS, HOW DIFFICULT HAVE THESE PROBLEMS MADE IT FOR YOU TO DO YOUR WORK, TAKE CARE OF THINGS AT HOME, OR GET ALONG WITH OTHER PEOPLE: SOMEWHAT DIFFICULT
10. IF YOU CHECKED OFF ANY PROBLEMS, HOW DIFFICULT HAVE THESE PROBLEMS MADE IT FOR YOU TO DO YOUR WORK, TAKE CARE OF THINGS AT HOME, OR GET ALONG WITH OTHER PEOPLE: SOMEWHAT DIFFICULT
SUM OF ALL RESPONSES TO PHQ QUESTIONS 1-9: 7
SUM OF ALL RESPONSES TO PHQ QUESTIONS 1-9: 7

## 2023-03-09 NOTE — PROGRESS NOTES
[facility]  March 9, 2023      Behavioral Health Clinician Progress Note    Patient Name: Christiane Hernandez           Service Type:  Individual      [place] [facility]   [facility] [place] Email (patient reached)     Session Start Time: 14:02  Session End Time: 14:27      Session Length: 16 - 37      Attendees: Patient     Service Modality:  Video Visit:      Provider verified identity through the following two step process.  Patient provided:  Patient is known previously to provider and Patient was verified at admission/transfer    Telemedicine Visit: The patient's condition can be safely assessed and treated via synchronous audio and visual telemedicine encounter.      Reason for Telemedicine Visit: Services only offered telehealth    Originating Site (Patient Location): Patient's home    Distant Site (Provider Location): Provider Remote Setting- Home Office    Consent:  The patient/guardian has verbally consented to: the potential risks and benefits of telemedicine (video visit) versus in person care; bill my insurance or make self-payment for services provided; and responsibility for payment of non-covered services.     Patient would like the video invitation sent by:  My Chart    Mode of Communication:  Video Conference via [employer]well    Distant Location (Provider):  Off-site    As the provider I attest to compliance with applicable laws and regulations related to telemedicine.    Visit Activities (Refresh list every visit): [facility]    Diagnostic Assessment Date: 10/13/2022  Treatment Plan Review Date: completed 03/09/2023, next due by 06/09/2023  See Flowsheets for today's PHQ-9 and ABIDA-7 results  Previous PHQ-9:   PHQ-9 SCORE 3/6/2023 3/9/2023 3/9/2023   PHQ-9 Total Score MyChart 9 (Mild depression) - 7 (Mild depression)   PHQ-9 Total Score 9 7 7     Previous ABIDA-7:   ABIDA-7 SCORE 8/25/2020 10/12/2022 10/12/2022   Total Score - - 4 (minimal anxiety)   Total Score 14 4  "4       [place] LEVEL:  No flowsheet data found.    DATA  Extended Session (60+ minutes): No  Interactive Complexity: No  Crisis: No  [facility] Patient: No    Treatment Objective(s) Addressed in This Session:  Target Behavior(s): Improving focus    Attention Problems: will develop coping skills to effectively manage attention issues    Current Stressors / Issues:   Update: \"ok.\" Has been sick with a cold. Stopped taking medication last week. Felt \"stuck\" on it; hyper-focused but not productive. Sits to work and then feels like can't move or do anything else. Feels that jumping to 36 mg was too much. Since being off the medication, she feels her personality is coming back and not such a robot. Kept feeling a chemical feeling to keep going and not able to slow down.  Patient recognizes that her previous symptoms will most likely return but is not sure what to do.  [facility] validated patient's frustrations with the medication and discussed realistic expectations.  [facility] weighed the pros and cons of medication versus not and encouraged patient to choose what feels best for her.  [facility] discussed patient setting up ADHD specific therapy and driving it with her own expectations.  [facility] processed patient continuing to focus on a supportive environment.  [facility] educated about how to maintain a routine but also allow for flexibility/adaptability as needed.    Stressors: Grandfather  2023. Feeling the winter has been heavy. Vacation in April.    Tx: Dominique Moreno at [facility]. Has been seeing every other week or every three weeks for maintenance. Paying out of pocket. Looked into ADHD specific therapy but scheduled so far out. ADHD coaching is expensive.  [facility] encouraged patient to continue looking into options and use websites such as www.psychologytoday.com or www.[employer].    Etoh: not often, couple times a month  Substance: cannabis gummies a couple times a month  Nicotine: denies, non smoker  Caffeine: 1 green " tea a day    Most Important: lower dose (if available) or other medication options. Gene Site testing?    Treatment plan updated    Progress on Treatment Objective(s) / Homework:  Satisfactory progress - ACTION (Actively working towards change); Intervened by reinforcing change plan / affirming steps taken    Motivational Interviewing    MI Intervention: Co-Developed Goal: Improving focus, Expressed Empathy/Understanding, Open-ended questions, Reflections: simple and complex and Change talk (evoked)     Change Talk Expressed by the Patient: Desire to change Ability to change Reasons to change Need to change Committment to change Activation Taking steps    Provider Response to Change Talk: E - Evoked more info from patient about behavior change, A - Affirmed patient's thoughts, decisions, or attempts at behavior change and R - Reflected patient's change talk    Also provided psychoeducation about behavioral health condition, symptoms, and treatment options    Care Plan review completed: Yes    Medication Review:  No changes to current psychiatric medication(s)    Medication Compliance:  No Patient stopped taking her medication last week    Changes in Health Issues:   None reported    Chemical Use Review:   Substance Use: Chemical use reviewed, no active concerns identified      Tobacco Use: No current tobacco use.      Assessment: Current Emotional / Mental Status (status of significant symptoms):  Risk status (Self / Other harm or suicidal ideation)  Patient denies a history of suicidal ideation, suicide attempts, self-injurious behavior, homicidal ideation, homicidal behavior and and other safety concerns  Patient denies current fears or concerns for personal safety.  Patient denies current or recent suicidal ideation or behaviors.  Patient denies current or recent homicidal ideation or behaviors.  Patient denies current or recent self injurious behavior or ideation.  Patient denies other safety concerns.  A safety  and risk management plan has not been developed at this time, however patient was encouraged to call [place] [phone] should there be a change in any of these risk factors.    Appearance:   Appropriate   Eye Contact:   Good   Psychomotor Behavior: Normal   Attitude:   Cooperative  Interested  Orientation:   All  Speech   Rate / Production: Normal    Volume:  Normal   Mood:    Anxious   Affect:    Appropriate   Thought Content:  Clear   Thought Form:  Coherent  Logical   Insight:    Good     Diagnoses:  1. ADHD (attention deficit hyperactivity disorder), inattentive type    2. Generalized anxiety disorder    3. Mild episode of recurrent major depressive disorder (H)        Collateral Reports Completed:  Communicated with: [facility] Team    Plan: (Homework, other):  Patient was given information about behavioral services and encouraged to schedule a follow up appointment with the clinic [facility] as needed.  She was also given information about mental health symptoms and treatment options .  CD Recommendations: No indications of CD issues.     ______________________________________________________________________    Integrated Primary Care Behavioral Health Treatment Plan    Patient's Name: Christiane Hernandez  YOB: 1990    Date of Creation: 12/01/2022  Date Treatment Plan Last Reviewed/Revised: 03/09/2023    DSM5 Diagnoses: Attention-Deficit/Hyperactivity Disorder  314.00 (F90.0) Predominantly inattentive presentation, 296.31 (F33.0) Major Depressive Disorder, Recurrent Episode, Mild _ or 300.02 (F41.1) Generalized Anxiety Disorder  Psychosocial / Contextual Factors: Patient lives with her partner.  Patient is working full-time from home.  Patient does have family and social supports.  PROMIS (reviewed every 90 days): 26    Referral / Collaboration:  Patient is connected with an individual therapist and will research ADHD specific therapist.    Anticipated number of session for this episode of care: 3-5  sessions  Anticipation frequency of session: Monthly  Anticipated Duration of each session: 16-37 minutes  Treatment plan will be reviewed in 90 days or when goals have been changed.       MeasurableTreatment Goal(s) related to diagnosis / functional impairment(s)  Goal 1: Patient will report increased attention and task completion, especially with work.    I will know I've met my goal when I am getting done what I need to.      Objective #A (Patient Action)    Patient will identify and use 2-3 strategies to improve organization.  Status: Continued - Date(s): 03/09/2023    Intervention(s)  Therapist will teach Skills to assist with ADHD needs.    Objective #B  Patient will use cognitive strategies identified in therapy to challenge anxious thoughts.  Status: Continued - Date(s): 03/09/2023    Intervention(s)  Therapist will assign homework For patient to focus on relaxation time and separation of work and home.      Patient has reviewed and agreed to the above plan.      Emily Maddox, DEZ  March 9, 2023    Answers for HPI/ROS submitted by the patient on 3/9/2023  If you checked off any problems, how difficult have these problems made it for you to do your work, take care of things at home, or get along with other people?: Somewhat difficult  PHQ9 TOTAL SCORE: 7

## 2023-03-09 NOTE — NURSING NOTE
Is the patient currently in the state of [place]? YES    Visit mode:VIDEO    If the visit is dropped, the patient can be reconnected by: VIDEO VISIT: Send to e-mail at: [email or web address]    Will anyone else be joining the visit? NO      How would you like to obtain your AVS? MyChart    Are changes needed to the allergy or medication list? NO    Reason for visit: return    Amarilis ELIZABETH

## 2023-03-09 NOTE — PROGRESS NOTES
"  [facility] PSYCHIATRY SERVICE FOLLOW-UP     Name:  Christiane Hernandez  : 1990     Telemedicine Visit: The patient's condition can be safely assessed and treated via synchronous audio and visual telemedicine encounter.      Reason for Telemedicine Visit: COVID 19 pandemic and the social and physical recommendations by the CDC and MDH.      Originating Site (Patient Location): Patient's home     Distant Site (Provider Location): Provider Remote Setting     Consent:  The patient/guardian has verbally consented to: the potential risks and benefits of telemedicine (video visit or phone) versus in person care; bill my insurance or make self-payment for services provided; and responsibility for payment of non-covered services.     Mode of Communication:  Vitas[employer] video platform      As the provider I attest to compliance with applicable laws and regulations related to telemedicine.    IDENTIFICATION     Patient is a 32 year old year old White, female  who [facility]  Patient was initially referred by their PCP. Patient attended the session alone.     Patient care team: Patient Care Team:  Eliane Shaver NP as PCP - General (Nurse Practitioner - Family)  Eliane Shaver NP as Assigned PCP  Radha Terrell NP as Assigned Neuroscience Provider    INTERIM HISTORY   Pt was last seen in [facility] for follow-up on 10 sage 23. At that time, the plan included increase concerta to 27mg daily. Discussed increasing to 36mg if needed in the future..    Interim pt communication:  rx concerta 36mg sent to pharmacy; unable to fill 27mg    Available records were reviewed prior to visit.    HISTORY OF PRESENT ILLNESS     Per [facility] Emily Maddox during today's team-based visit: \"MH Update: \"ok.\" Has been sick with a cold. Stopped taking medication last week. Felt \"stuck\" on it; hyper-focused but not productive. Sits to work and then feels like can't move or do anything else. Feels " "that jumping to 36 mg was too much. Since being off the medication, she feels her personality is coming back and not such a robot. Kept feeling a chemical feeling to keep going and not able to slow down.  adhd specific therapy- setting own expectations, environment  Stressors: Grandfather  2023. Feeling the winter has been heavy. Vacation in April.  Tx: Dominique Moreno at [facility]. Has been seeing every other week or every three weeks for maintenance. Paying out of pocket. Looked into ADHD specific therapy but scheduled so far out. ADHD coaching is expensive.  Etoh: not often, couple times a month  Substance: cannabis gummies a couple times a month  Nicotine: denies, non smoker  Caffeine: 1 green tea a day  Most Important: lower dose (if available) or other medication options. Gene Site testing?\"    ---Psychiatry Update---  Recovering from getting a head cold. She took the day off to take care of herself. Pt hasn't had much routine since her grandpa  in mid-January.   She thinks Concerta 36mg \"is a little too much.\" She feels like she cannot do anything but hyperfocus on work. She notices she gets more mood swings at the end of the day after Concerta wears off. She hasn't take it since last Monday.   She has been thinking of going in to the office but is feeling a bit shy and overwhelmed about it. She endorses ongoing anxiety about not being successful or not living up to the self-imposed standards of who she can be. \"I feel it is more a feeling of hopelessness than anxiety or depression.\"    Pt describes ongoing self-esteem problems that contribute to depression and anxiety. She continues to belief low self-worth stems from ADHD and her struggles to meet her goals personally and professionally. She doesn't want to rehash her past when she meets with a new therapist. \"I want to create some skills for my day to day.\"     Suicidal ideation:  No   PHQ9 score is 7 indicating mild depression. "   GAD2 score is 2 indicating subthreshhold anxiety    Medication side effects: pt thinks current dose is causing problems with hyperfocus and feeling like a zombie. She is noticing decreased appetite.     Sleep: Pt didn't notice any problem with sleep while taking Concerta. Largely stable sleep right now.     Medical: Recent cold. Breast US by PCP due to lump and family hx of br ca.    MEDICATIONS                                                                                              Current medications reviewed today and are noted below.   Current psychotropic medications:   Concerta 36mg - stopped      Past psychotropic medications:  Escitalopram - intolerable SEs  Bupropion  Atomoxetine  Vilazodone - worked well for anx/dep. Didn't feel emotionally numb.     Supplements:   See below       1/30/23 Concerta 36mg #30  12/23/22 Concerta 18mg #30    Current Outpatient Medications   Medication Sig     IBUPROFEN PO Take by mouth as needed for moderate pain     methylphenidate HCl ER (CONCERTA) 36 MG CR tablet Take 1 tablet (36 mg) by mouth every morning     Multiple Vitamin (MULTI-VITAMIN DAILY PO) Take by mouth daily     norethindrone-ethinyl estradiol (MICROGESTIN 1/20) 1-20 MG-MCG tablet Take 1 tablet by mouth daily Take continuously     valACYclovir (VALTREX) 1000 mg tablet Take 2 tablets twice daily as needed for cold sores     No current facility-administered medications for this visit.        VITALS   There were no vitals taken for this visit.    Pulse Readings from Last 5 Encounters:   03/06/23 70   04/12/22 65   02/08/21 64   08/25/20 64   02/13/20 72     Wt Readings from Last 5 Encounters:   03/06/23 67.6 kg (149 lb)   04/12/22 72.1 kg (159 lb)   02/08/21 68.5 kg (151 lb)   08/25/20 64.4 kg (142 lb)   02/13/20 65.8 kg (145 lb)     BP Readings from Last 5 Encounters:   03/06/23 127/83   04/12/22 132/78   02/08/21 120/75   08/25/20 112/78   02/13/20 124/66       LABS & IMAGING                              "                                                                                   Recent available labs reviewed today.    Recent Labs   Lab Test 11/15/16  0000   CR 0.76   GFRESTIMATED 109     Recent Labs   Lab Test 11/15/16  0000   AST 13   ALT 12     Recent Labs   Lab Test 04/12/22  1643 11/15/16  0000   TSH 1.70 0.76       ALLERGY & IMMUNIZATIONS       Allergies   Allergen Reactions     Zithromax [Azithromycin]      Nausea and vomiting       MEDICAL & SURGICAL HISTORY    Reviewed past medical and surgical history today.   Pregnant - denies.     Past Medical History:   Diagnosis Date     Irregular menses      Kidney stones      Mood disorder (H)     anxiety, depression, possible BAD     PMDD (premenstrual dysphoric disorder)        MENTAL STATUS EXAM:     Alertness: alert  and oriented  Appearance: adequately groomed  Behavior/Demeanor: cooperative, pleasant and calm, with good eye contact   Speech: normal and regular rate and rhythm  Language: intact and no problems  Psychomotor: normal or unremarkable  Mood: \"okay\"  Affect: full range and appropriate; was congruent to mood; was congruent to content  Thought Process/Associations: unremarkable  Thought Content:  Reports none;  Denies suicidal ideation, violent ideation and delusions  Perception:  Reports none;  Denies auditory hallucinations and visual hallucinations  Insight: intact  Judgment: intact  Cognition: does  appear grossly intact; formal cognitive testing was not done  Gait and Station: FRAN     RISK AND PROTECTIVE FACTORS     Static Risk Factors: prior attempts, history of MH diagnoses and/or treatment and impulsivity  Firearms/Weapons Access: Yes Locked up  Dynamic Risk Factors: emotional distress, substance use, anxiety, access to means and mental health stigma     Protective Factors: hope for the future, compliance with medication, problem solving ability, future oriented, healthy intimate relationships, engaged in EBT, perceived internal locus of " control, access to care as needed and displaying help seeking behavior     SAFETY ASSESSMENT      Based on review of above risk and protective factors and today's exam, pt is not at elevated risk of harm to self or others. She does not meet criteria for a 72 hr hold and remains appropriate for ongoing outpatient care. The patient convincingly denies suicidality today. There was no deceit detected, and the patient presented in a manner that was believable. Local community safety resources printed and reviewed for patient to use if needed.     Recommended that patient call 911 or go to the local ED should there be a change in any of these risk factors.     DSM 5 DIAGNOSIS      Attention-Deficit/Hyperactivity Disorder  314.00 (F90.0) Predominantly inattentive presentation  300.02 (F41.1) Generalized Anxiety Disorder     DIFFERENTIAL DIAGNOSIS: r/o BAD (type I vs II vs cyclothymia)     Medical comorbidities impacting or contributing to clinical picture: None noted  Known issue that I take into account for their medical decisions, no current exacerbations or new concerns.    ASSESSMENT AND PLAN      ASSESSMENT:  Christiane Hernandez is a 32 year old White, female who presents for return visit with [facility] ([facility]) for medication management.   10 Ar 22: Pt with hx of ABIDA, ADHD, and Bipolar Disorder type I per psych testing who returns to clinic feeling defeated about her functional abilities, especially at work. She perceives that she should be able to be more organized, task oriented, and be better able to manage multiple demands at work since starting Concerta but she isn't. We discussed likely need to increase the dose. She is agreeable to this and we reviewed r/b/se stimulants again today. Discussed checking in via [facility] in 2-3 weeks with f/u appt in 6 weeks due to finances; pt agrees. I recommended she pursue ADHD coaching/therapy as her self-worth is very closely tied with her ability to be  successful at work/perform as she wants/needs to. Pt denies safety concerns.  Today 9 Mar 23: Pt with hx of ABIDA, ADHD, and Bipolar Disorder type I per psych testing who returns to clinic reporting no significant change to ADHD sx with increased concerta. She perceived it caused too much hyperfocus and made her feel numb. We discussed trial of Adderall for different mechanism of action as I continue to suspect much of her sx and distress are r/t ADHD or secondary to ADHD untreated. She agrees to Adderall trial. I provided resources to several podcasts that review bx and nootropics for focus and motivation. Recommended RTC in 3-4 weeks; pt agrees. No safety concerns.     TREATMENT PLAN: Medication side effects and alternatives reviewed. Health promotion activities recommended and reviewed. All questions addressed. Education and counseling completed regarding risks and benefits of medications and psychotherapy options. [facility] Psych[facility] Service model reviewed today. Recommend therapy for additional support. Safety plan reviewed as indicated.     MEDICATIONS:   -stop CONCERTA  -start ADDERALL XR 10mg daily    LABS/RADS:   -None at this time    PATIENT STATUS:  [facility] offer care a specialty service for Primary Care Providers in the [facility] that seek to optimize psychotropic medications for unstable patients.  Once medications have been optimized, our providers discharge the patient back to the referring Primary Care Provider for ongoing medication management.  This type of system allows our providers to serve a high volume of patients.   -Pt will be seen for continued medication stabilization in [facility].    PSYCHOSOCIAL:   -continue therapy  -Follow up with primary care provider as planned or for acute medical concerns.    PSYCHOEDUCATION:  Medication side effects and alternatives reviewed. Health promotion activities recommended and reviewed today. All questions addressed. Education  and counseling completed regarding risks and benefits of medications and psychotherapy options.  Consent provided by patient/guardian  Call the psychiatric nurse line with medication questions or concerns at [phone].  MyChart may be used to communicate with your provider, but this is not intended to be used for emergencies.  STIMULANT THERAPY: Side effects discussed including but not limited to cardiac (including HTN, tachycardia, sudden death), motor/tic, appetite/growth, mood lability and sleep disruption. This is a controlled substance with risk for abuse, need to keep in a safe keep place and cannot replace lost scripts  Medlineplus.gov is information for patients.  It is run by the [employer] and it contains information about all disorders, diseases and all medications.      FOLLOW-UP: Follow up in 3-4 weeks    1. Continue all other treatments (including medications) per primary care provider and/or specialists.   2. To schedule individual or family therapy, call [facility] at [phone].   3. Follow up with primary care provider as planned or for acute medical concerns.  4. Call the psychiatric nurse line with medication questions or concerns at [phone] or [phone].  5. [facility] Inchart may be used to communicate with your care team, but this is not intended to be used for emergencies.    CRISIS RESOURCES:    1. Present to the Emergency Department as needed or call after hours crisis line at [phone] or [phone].   2. [place] Crisis Text Line: Text [place] to 649442.  3. Suicide LifeLine Chat: suicidepreventionlifeline.org/chat/.  4. National Suicide Prevention Lifeline: 109.949.4136 (TTY: 464.207.3159). Call anytime for help.  (www.suicidepreventionlifeline.org)  5. National [place] on Mental Illness (www.cesar.org): 585.549.4729 or 013-234-5072.  6. Mental Health Association (www.mentalhealth.org): 138.999.6200 or 241-210-2855.    ADMINISTRATIVE BILLING:    Time  spent interviewing patient, reviewing referral documents, obtaining and reviewing outside records, communication with other health specialists, and preparing this report on today's date  Video/Phone Start Time: 1431  Video/Phone End Time: 9304    Signed:   Deisy Terrell DNP, PMHNP-BC  [facility] ([facility])

## 2023-03-09 NOTE — PATIENT INSTRUCTIONS
Thank you for our time together today in [facility] ([facility] provides brief psychiatric medication stabilization to patients referred by their Primary Care Providers. Patients are typically seen in [facility] for up to 4 appointments and then referred back to the PCP for ongoing refills unless longer term medication management by a specialist is indicated. If I believe you will benefit from long-term psychiatric care I will discuss this with you. If you are interested in seeing a psychiatrist or psychiatric nurse practitioner long-term, please send me a message in Ryla so we can refer you appropriately.     Treatment Plan:  Stop Concerta  Start Adderall XR 10mg daily for ADHD  Check out the [facility] podcasts:  ADHD & How Anyone Can Improve Their Focus  Focus Toolkit: Tools to Improve Your Focus & Concentration  Optimize & Control Your Brain Chemistry to Improve Health & Performance  Tools to Manage Dopamine and Improve Motivation & Drive  Call [phone] to schedule follow up with me in 3-4 weeks.  You can call the above number to make appointments, leave a message with our nursing team, and inquire about any mental health referrals I have placed.

## 2023-03-10 ENCOUNTER — MYC MEDICAL ADVICE (OUTPATIENT)
Dept: FAMILY MEDICINE | Facility: CLINIC | Age: 33
End: 2023-03-10
Payer: COMMERCIAL

## 2023-03-10 DIAGNOSIS — Z80.8 FAMILY HISTORY OF MALIGNANT MELANOMA OF SKIN: Primary | ICD-10-CM

## 2023-03-10 RX ORDER — DEXTROAMPHETAMINE SACCHARATE, AMPHETAMINE ASPARTATE MONOHYDRATE, DEXTROAMPHETAMINE SULFATE AND AMPHETAMINE SULFATE 2.5; 2.5; 2.5; 2.5 MG/1; MG/1; MG/1; MG/1
10 CAPSULE, EXTENDED RELEASE ORAL DAILY
Qty: 30 CAPSULE | Refills: 0 | Status: SHIPPED | OUTPATIENT
Start: 2023-03-10 | End: 2024-03-27

## 2023-03-12 RX ORDER — VALACYCLOVIR HYDROCHLORIDE 1 G/1
TABLET, FILM COATED ORAL
Qty: 28 TABLET | Refills: 12 | Status: SHIPPED | OUTPATIENT
Start: 2023-03-12 | End: 2024-03-27

## 2023-03-15 NOTE — TELEPHONE ENCOUNTER
Nohemy: pended referral    I was just chatting with my mom and my dad did have melanoma. With that information, I would love a derm referral if available to me.     Esthela JOHANSEN RN  [facility]

## 2023-04-28 ENCOUNTER — HOSPITAL ENCOUNTER (OUTPATIENT)
Dept: MAMMOGRAPHY | Facility: CLINIC | Age: 33
Discharge: HOME | End: 2023-04-28
Attending: NURSE PRACTITIONER
Payer: COMMERCIAL

## 2023-04-28 DIAGNOSIS — N64.89 BREAST ASYMMETRY: ICD-10-CM

## 2023-04-28 PROCEDURE — 77062 BREAST TOMOSYNTHESIS BI: CPT

## 2023-04-28 PROCEDURE — 76642 ULTRASOUND BREAST LIMITED: CPT | Mod: RT

## 2024-02-05 ENCOUNTER — PATIENT OUTREACH (OUTPATIENT)
Dept: CARE COORDINATION | Facility: CLINIC | Age: 34
End: 2024-02-05
Payer: COMMERCIAL

## 2024-02-19 ENCOUNTER — PATIENT OUTREACH (OUTPATIENT)
Dept: CARE COORDINATION | Facility: CLINIC | Age: 34
End: 2024-02-19
Payer: COMMERCIAL

## 2024-03-27 ENCOUNTER — OFFICE VISIT (OUTPATIENT)
Dept: FAMILY MEDICINE | Facility: CLINIC | Age: 34
End: 2024-03-27
Payer: COMMERCIAL

## 2024-03-27 VITALS
HEIGHT: 66 IN | HEART RATE: 54 BPM | TEMPERATURE: 98.3 F | DIASTOLIC BLOOD PRESSURE: 82 MMHG | OXYGEN SATURATION: 100 % | BODY MASS INDEX: 22.5 KG/M2 | RESPIRATION RATE: 18 BRPM | WEIGHT: 140 LBS | SYSTOLIC BLOOD PRESSURE: 126 MMHG

## 2024-03-27 DIAGNOSIS — B00.1 COLD SORE: ICD-10-CM

## 2024-03-27 DIAGNOSIS — Z30.41 ENCOUNTER FOR SURVEILLANCE OF CONTRACEPTIVE PILLS: ICD-10-CM

## 2024-03-27 DIAGNOSIS — Z00.00 ROUTINE GENERAL MEDICAL EXAMINATION AT A HEALTH CARE FACILITY: Primary | ICD-10-CM

## 2024-03-27 PROBLEM — F33.0 MILD EPISODE OF RECURRENT MAJOR DEPRESSIVE DISORDER (H): Status: RESOLVED | Noted: 2022-10-13 | Resolved: 2024-03-27

## 2024-03-27 PROCEDURE — 90686 IIV4 VACC NO PRSV 0.5 ML IM: CPT | Performed by: NURSE PRACTITIONER

## 2024-03-27 PROCEDURE — 99395 PREV VISIT EST AGE 18-39: CPT | Mod: 25 | Performed by: NURSE PRACTITIONER

## 2024-03-27 PROCEDURE — 90471 IMMUNIZATION ADMIN: CPT | Performed by: NURSE PRACTITIONER

## 2024-03-27 RX ORDER — VALACYCLOVIR HYDROCHLORIDE 1 G/1
TABLET, FILM COATED ORAL
Qty: 28 TABLET | Refills: 12 | Status: SHIPPED | OUTPATIENT
Start: 2024-03-27

## 2024-03-27 RX ORDER — NORETHINDRONE ACETATE AND ETHINYL ESTRADIOL .02; 1 MG/1; MG/1
1 TABLET ORAL DAILY
Qty: 112 TABLET | Refills: 4 | Status: SHIPPED | OUTPATIENT
Start: 2024-03-27

## 2024-03-27 SDOH — HEALTH STABILITY: PHYSICAL HEALTH: ON AVERAGE, HOW MANY MINUTES DO YOU ENGAGE IN EXERCISE AT THIS LEVEL?: 50 MIN

## 2024-03-27 SDOH — HEALTH STABILITY: PHYSICAL HEALTH: ON AVERAGE, HOW MANY DAYS PER WEEK DO YOU ENGAGE IN MODERATE TO STRENUOUS EXERCISE (LIKE A BRISK WALK)?: 7 DAYS

## 2024-03-27 ASSESSMENT — PATIENT HEALTH QUESTIONNAIRE - PHQ9
SUM OF ALL RESPONSES TO PHQ QUESTIONS 1-9: 2
SUM OF ALL RESPONSES TO PHQ QUESTIONS 1-9: 2
10. IF YOU CHECKED OFF ANY PROBLEMS, HOW DIFFICULT HAVE THESE PROBLEMS MADE IT FOR YOU TO DO YOUR WORK, TAKE CARE OF THINGS AT HOME, OR GET ALONG WITH OTHER PEOPLE: NOT DIFFICULT AT ALL

## 2024-03-27 ASSESSMENT — SOCIAL DETERMINANTS OF HEALTH (SDOH): HOW OFTEN DO YOU GET TOGETHER WITH FRIENDS OR RELATIVES?: ONCE A WEEK

## 2024-03-27 ASSESSMENT — PAIN SCALES - GENERAL: PAINLEVEL: MILD PAIN (2)

## 2024-03-27 NOTE — PROGRESS NOTES
Preventive Care Visit  [facility]  Eliane Shaver, NP, Nurse Practitioner - Family  Mar 27, 2024      Assessment & Plan     (Z00.00) Routine general medical examination at a health care facility  (primary encounter diagnosis)  Comment:   Plan:     (Z30.41) Encounter for surveillance of contraceptive pills  Comment:   Plan: norethindrone-ethinyl estradiol (MICROGESTIN         1/20) 1-20 MG-MCG tablet        Refills given.     (B00.1) Cold sore  Comment:   Plan: valACYclovir (VALTREX) 1000 mg tablet        Refills given.               Counseling  Appropriate preventive services were discussed with this patient, including applicable screening as appropriate for fall prevention, nutrition, physical activity, Tobacco-use cessation, weight loss and cognition.  Checklist reviewing preventive services available has been given to the patient.  Reviewed patient's diet, addressing concerns and/or questions.   The patient was instructed to see the dentist every 6 months.           Herberth Grande is a 33 year old, presenting for the following:  Physical        3/27/2024     2:47 PM   Additional Questions   Roomed by Yessi   Accompanied by Self         3/27/2024     2:47 PM   Patient Reported Additional Medications   Patient reports taking the following new medications None        Health Care Directive  Patient does not have a Health Care Directive or Living Will: Discussed advance care planning with patient; information given to patient to review.    HPI    She is currently working with a virtual PT program for bilateral hip pain she has developed while training for her first marathon.            3/27/2024   General Health   How would you rate your overall physical health? Good   Feel stress (tense, anxious, or unable to sleep) To some extent   (!) STRESS CONCERN      3/27/2024   Nutrition   Three or more servings of calcium each day? Yes   Diet: Vegetarian/vegan   How many servings of fruit  and vegetables per day? (!) 2-3   How many sweetened beverages each day? 0-1         3/27/2024   Exercise   Days per week of moderate/strenous exercise 7 days   Average minutes spent exercising at this level 50 min         3/27/2024   Social Factors   Frequency of gathering with friends or relatives Once a week   Worry food won't last until get money to buy more No   Food not last or not have enough money for food? No   Do you have housing?  Yes   Are you worried about losing your housing? No   Lack of transportation? No   Unable to get utilities (heat,electricity)? No         3/27/2024   Dental   Dentist two times every year? (!) NO         3/27/2024   TB Screening   Were you born outside of the [place]? No       Today's PHQ-9 Score:       3/27/2024     1:09 PM   PHQ-9 SCORE   PHQ-9 Total Score MyChart 2 (Minimal depression)   PHQ-9 Total Score 2         3/27/2024   Substance Use   Alcohol more than 3/day or more than 7/wk No   Do you use any other substances recreationally? No     Social History     Tobacco Use    Smoking status: Never     Passive exposure: Never    Smokeless tobacco: Never   Vaping Use    Vaping Use: Never used   Substance Use Topics    Alcohol use: Yes     Comment: occasionally. less than 5 a week     Drug use: Yes     Types: Marijuana           4/28/2023   LAST FHS-7 RESULTS   1st degree relative breast or ovarian cancer No   Any ONE woman have BOTH breast AND ovarian cancer No   Any woman with breast cancer before 50yrs Yes   2 or more relatives with breast AND/OR ovarian cancer Yes   2 or more relatives with breast AND/OR bowel cancer No                3/27/2024   STI Screening   New sexual partner(s) since last STI/HIV test? No     History of abnormal Pap smear: NO - age 30-65 PAP every 5 years with negative HPV co-testing recommended        Latest Ref Rng & Units 4/12/2022     4:29 PM 5/9/2019     4:06 PM 11/15/2016    12:00 AM   PAP / HPV   PAP  Negative for Intraepithelial Lesion or Malignancy  "(NILM)      PAP (Historical)   NIL     HPV 16 DNA Negative Negative      HPV 18 DNA Negative Negative      Other HR HPV Negative Negative      PAP-ABSTRACT    See Scanned Document           This result is from an external source.           3/27/2024   Contraception/Family Planning   Questions about contraception or family planning No        Reviewed and updated as needed this visit by Provider                             Objective    Exam  /82 (BP Location: Right arm, Patient Position: Sitting, Cuff Size: Adult Regular)   Pulse 54   Temp 98.3  F (36.8  C) (Temporal)   Resp 18   Ht 1.688 m (5' 6.46\")   Wt 63.5 kg (140 lb)   SpO2 100%   Breastfeeding No   BMI 22.29 kg/m     Estimated body mass index is 22.29 kg/m  as calculated from the following:    Height as of this encounter: 1.688 m (5' 6.46\").    Weight as of this encounter: 63.5 kg (140 lb).    Physical Exam  GENERAL: alert and no distress  EYES: Eyes grossly normal to inspection, PERRL and conjunctivae and sclerae normal  HENT: ear canals and TM's normal, nose and mouth without ulcers or lesions  NECK: no adenopathy, no asymmetry, masses, or scars  RESP: lungs clear to auscultation - no rales, rhonchi or wheezes  BREAST: normal without masses, tenderness or nipple discharge and no palpable axillary masses or adenopathy  CV: regular rate and rhythm, normal S1 S2, no S3 or S4, no murmur, click or rub, no peripheral edema  ABDOMEN: soft, nontender, no hepatosplenomegaly, no masses and bowel sounds normal  MS: no gross musculoskeletal defects noted, no edema  SKIN: no suspicious lesions or rashes  NEURO: Normal strength and tone, mentation intact and speech normal  PSYCH: mentation appears normal, affect normal/bright        Signed Electronically by: Eliane Shaver NP    Answers submitted by the patient for this visit:  Patient Health Questionnaire (Submitted on 3/27/2024)  If you checked off any problems, how difficult have these problems made " it for you to do your work, take care of things at home, or get along with other people?: Not difficult at all  PHQ9 TOTAL SCORE: 2

## 2024-03-27 NOTE — PATIENT INSTRUCTIONS
Preventive Care Advice   This is general advice given by our system to help you stay healthy. However, your care team may have specific advice just for you. Please talk to your care team about your preventive care needs.  Nutrition  Eat 5 or more servings of fruits and vegetables each day.  Try wheat bread, brown rice and whole grain pasta (instead of white bread, rice, and pasta).  Get enough calcium and vitamin D. Check the label on foods and aim for 100% of the RDA (recommended daily allowance).  Lifestyle  Exercise at least 150 minutes each week   (30 minutes a day, 5 days a week).  Do muscle strengthening activities 2 days a week. These help control your weight and prevent disease.  No smoking.  Wear sunscreen to prevent skin cancer.  Have a dental exam and cleaning every 6 months.  Yearly exams  See your health care team every year to talk about:  Any changes in your health.  Any medicines your care team has prescribed.  Preventive care, family planning, and ways to prevent chronic diseases.  Shots (vaccines)   HPV shots (up to age 26), if you've never had them before.  Hepatitis B shots (up to age 59), if you've never had them before.  COVID-19 shot: Get this shot when it's due.  Flu shot: Get a flu shot every year.  Tetanus shot: Get a tetanus shot every 10 years.  Pneumococcal, hepatitis A, and RSV shots: Ask your care team if you need these based on your risk.  Shingles shot (for age 50 and up).  General health tests  Diabetes screening:  Starting at age 35, Get screened for diabetes at least every 3 years.  If you are younger than age 35, ask your care team if you should be screened for diabetes.  Cholesterol test: At age 39, start having a cholesterol test every 5 years, or more often if advised.  Bone density scan (DEXA): At age 50, ask your care team if you should have this scan for osteoporosis (brittle bones).  Hepatitis C: Get tested at least once in your life.  STIs (sexually transmitted  infections)  Before age 24: Ask your care team if you should be screened for STIs.  After age 24: Get screened for STIs if you're at risk. You are at risk for STIs (including HIV) if:  You are sexually active with more than one person.  You don't use condoms every time.  You or a partner was diagnosed with a sexually transmitted infection.  If you are at risk for HIV, ask about PrEP medicine to prevent HIV.  Get tested for HIV at least once in your life, whether you are at risk for HIV or not.  Cancer screening tests  Cervical cancer screening: If you have a cervix, begin getting regular cervical cancer screening tests at age 21. Most people who have regular screenings with normal results can stop after age 65. Talk about this with your provider.  Breast cancer scan (mammogram): If you've ever had breasts, begin having regular mammograms starting at age 40. This is a scan to check for breast cancer.  Colon cancer screening: It is important to start screening for colon cancer at age 45.  Have a colonoscopy test every 10 years (or more often if you're at risk) Or, ask your provider about stool tests like a FIT test every year or Cologuard test every 3 years.  To learn more about your testing options, visit: https://www.[employer]/053481.pdf.  For help making a decision, visit: https://bit.ly/ac04353.  Prostate cancer screening test: If you have a prostate and are age 55 to 69, ask your provider if you would benefit from a yearly prostate cancer screening test.  Lung cancer screening: If you are a current or former smoker age 50 to 80, ask your care team if ongoing lung cancer screenings are right for you.  For informational purposes only. Not to replace the advice of your health care provider. Copyright   2023 [employer]. All rights reserved. Clinically reviewed by the [facility] Transitions Program. [employer] 735564 - REV 01/24.    Learning About Stress  What is stress?     Stress is your  body's response to a hard situation. Your body can have a physical, emotional, or mental response. Stress is a fact of life for most people, and it affects everyone differently. What causes stress for you may not be stressful for someone else.  A lot of things can cause stress. You may feel stress when you go on a job interview, take a test, or run a race. This kind of short-term stress is normal and even useful. It can help you if you need to work hard or react quickly. For example, stress can help you finish an important job on time.  Long-term stress is caused by ongoing stressful situations or events. Examples of long-term stress include long-term health problems, ongoing problems at work, or conflicts in your family. Long-term stress can harm your health.  How does stress affect your health?  When you are stressed, your body responds as though you are in danger. It makes hormones that speed up your heart, make you breathe faster, and give you a burst of energy. This is called the fight-or-flight stress response. If the stress is over quickly, your body goes back to normal and no harm is done.  But if stress happens too often or lasts too long, it can have bad effects. Long-term stress can make you more likely to get sick, and it can make symptoms of some diseases worse. If you tense up when you are stressed, you may develop neck, shoulder, or low back pain. Stress is linked to high blood pressure and heart disease.  Stress also harms your emotional health. It can make you mondragon, tense, or depressed. Your relationships may suffer, and you may not do well at work or school.  What can you do to manage stress?  You can try these things to help manage stress:   Do something active. Exercise or activity can help reduce stress. Walking is a great way to get started. Even everyday activities such as housecleaning or yard work can help.  Try yoga or samantha chi. These techniques combine exercise and meditation. You may need  some training at first to learn them.  Do something you enjoy. For example, listen to music or go to a movie. Practice your hobby or do volunteer work.  Meditate. This can help you relax, because you are not worrying about what happened before or what may happen in the future.  Do guided imagery. Imagine yourself in any setting that helps you feel calm. You can use online videos, books, or a teacher to guide you.  Do breathing exercises. For example:  From a standing position, bend forward from the waist with your knees slightly bent. Let your arms dangle close to the floor.  Breathe in slowly and deeply as you return to a standing position. Roll up slowly and lift your head last.  Hold your breath for just a few seconds in the standing position.  Breathe out slowly and bend forward from the waist.  Let your feelings out. Talk, laugh, cry, and express anger when you need to. Talking with supportive friends or family, a counselor, or a julien leader about your feelings is a healthy way to relieve stress. Avoid discussing your feelings with people who make you feel worse.  Write. It may help to write about things that are bothering you. This helps you find out how much stress you feel and what is causing it. When you know this, you can find better ways to cope.  What can you do to prevent stress?  You might try some of these things to help prevent stress:  Manage your time. This helps you find time to do the things you want and need to do.  Get enough sleep. Your body recovers from the stresses of the day while you are sleeping.  Get support. Your family, friends, and community can make a difference in how you experience stress.  Limit your news feed. Avoid or limit time on social media or news that may make you feel stressed.  Do something active. Exercise or activity can help reduce stress. Walking is a great way to get started.  Where can you learn more?  Go to https://www.healthwise.net/patiented  Enter N032 in the  "search box to learn more about \"Learning About Stress.\"  Current as of: October 24, 2023               Content Version: 14.0    [id number] [employer].   Care instructions adapted under license by your healthcare professional. If you have questions about a medical condition or this instruction, always ask your healthcare professional. [employer] disclaims any warranty or liability for your use of this information.      "

## 2024-06-11 ENCOUNTER — MYC MEDICAL ADVICE (OUTPATIENT)
Dept: FAMILY MEDICINE | Facility: CLINIC | Age: 34
End: 2024-06-11
Payer: COMMERCIAL

## 2024-06-11 DIAGNOSIS — M25.551 HIP PAIN, RIGHT: Primary | ICD-10-CM

## 2024-06-12 NOTE — TELEPHONE ENCOUNTER
"Nohemy Shaver --    Please review and advise: referral PT  Pended below.     Patient requesting PT referral due to \"I am now less than 2 weeks out from my first marathon and just started experiencing fairly serious \"sensation\" in my right hip/hip flexor/groin/thigh.\"    GISELLA DunhamN RN  [facility]           "

## 2024-07-11 ASSESSMENT — ACTIVITIES OF DAILY LIVING (ADL)
SPORTS_TOTAL_ITEM_SCORE: 0
SLEEPING: MY SLEEP IS OCCASIONALLY INTERRUPTED BY PAIN.
CUTTING/LATERAL_MOVEMENTS: SLIGHT DIFFICULTY
PERSONAL_CARE: I CAN LOOK AFTER MYSELF NORMALLY WITHOUT CAUSING ADDITIONAL PAIN.
MY_BACK_PAIN_HAS_SPREAD_DOWN_MY_LEG(S)_AT_SOME_TIME_IN_THE_LAST_2_WEEKS: DISAGREE
I_HAVE_ONLY_WALKED_SHORT_DISTANCES_BECAUSE_OF_MY_BACK_PAIN: DISAGREE
SOCIAL_LIFE: MY SOCIAL LIFE IS NORMAL AND CAUSES ME NO ADDITIONAL PAIN.
HOS_ADL_ITEM_SCORE_TOTAL: 66
SITTING FOR 15 MINUTES: NO DIFFICULTY AT ALL
GOING DOWN 1 FLIGHT OF STAIRS: NO DIFFICULTY AT ALL
SWINGING_OBJECTS_LIKE_A_GOLF_CLUB: NO DIFFICULTY AT ALL
DEEP_SQUATTING: NO DIFFICULTY AT ALL
OSWESTRY_DISABILITY_INDEX:_COUNT: 10
SPORTS_SCORE(%): 0
LANDING: NO DIFFICULTY AT ALL
WALKING_15_MINUTES_OR_GREATER: NO DIFFICULTY AT ALL
PAIN_INTENSITY: THE PAIN IS VERY MILD AT THE MOMENT.
ROLLING_OVER_IN_BED: NO DIFFICULTY AT ALL
WALKING_DOWN_STEEP_HILLS: NO DIFFICULTY AT ALL
ADL_TOTAL_ITEM_SCORE: 0
ABILITY_TO_PARTICIPATE_IN_YOUR_DESIRED_SPORT_AS_LONG_AS_YOU_WOULD_LIKE: SLIGHT DIFFICULTY
SITTING: I CAN SIT IN ANY CHAIR AS LONG AS I LIKE.
SITTING_FOR_15_MINUTES: NO DIFFICULTY AT ALL
GOING_UP_1_FLIGHT_OF_STAIRS: NO DIFFICULTY AT ALL
LIGHT_TO_MODERATE_WORK: NO DIFFICULTY AT ALL
WALKING_UP_STEEP_HILLS: NO DIFFICULTY AT ALL
OSWESTRY DISABILITY INDEX_TOTAL_SCORE: 2
GETTING INTO AND OUT OF AN AVERAGE CAR: SLIGHT DIFFICULTY
TWISTING/PIVOTING_ON_INVOLVED_LEG: NO DIFFICULTY AT ALL
I_HAVE_HAD_PAIN_IN_THE_SHOULDER_OR_NECK_AT_SOME_TIME_IN_THE_LAST_2_WEEKS: DISAGREE
WALKING_APPROXIMATELY_10_MINUTES: NO DIFFICULTY AT ALL
GOING_DOWN_1_FLIGHT_OF_STAIRS: NO DIFFICULTY AT ALL
WALKING_FOR_APPROXIMATELY_10_MINUTES: NO DIFFICULTY AT ALL
ADL_HIGHEST_POTENTIAL_SCORE: 68
STEPPING_UP_AND_DOWN_CURBS: NO DIFFICULTY AT ALL
HOW_WOULD_YOU_RATE_YOUR_CURRENT_LEVEL_OF_FUNCTION_DURING_YOUR_USUAL_ACTIVITIES_OF_DAILY_LIVING_FROM_0_TO_100_WITH_100_BEING_YOUR_LEVEL_OF_FUNCTION_PRIOR_TO_YOUR_HIP_PROBLEM_AND_0_BEING_THE_INABILITY_TO_PERFORM_ANY_OF_YOUR_USUAL_DAILY_ACTIVITIES?: 80
PUTTING_ON_SOCKS_AND_SHOES: SLIGHT DIFFICULTY
LOW_IMPACT_ACTIVITIES_LIKE_FAST_WALKING: NO DIFFICULTY AT ALL
LIGHT_TO_MODERATE_WORK: NO DIFFICULTY AT ALL
SPORTS_HIGHEST_POTENTIAL_SCORE: 36
KEELE ASSESSMENT OF PARTICIPATION_SUB_SCORE_(Q5-9): 0
COMPUTED_OSWESTRY_SCORE: 4
HOS_ADL_HIGHEST_POTENTIAL_SCORE: 68
SECTION_4-WALKING: PAIN DOES NOT PREVENT ME FROM WALKING ANY DISTANCE.
DEEP SQUATTING: NO DIFFICULTY AT ALL
KEELE_TOTAL_SCORE: 0
STEPPING UP AND DOWN CURBS: NO DIFFICULTY AT ALL
HOW_BOTHERSOME_HAS_YOUR_BACK_PAIN_BEEN_IN_THE_LAST_2_WEEKS: SLIGHTLY
GETTING_INTO_AND_OUT_OF_A_BATHTUB: NO DIFFICULTY AT ALL
SPORTS_COUNT: 9
STARTING_AND_STOPPING_QUICKLY: NO DIFFICULTY AT ALL
ADL_COUNT: 17
HOS_ADL_SCORE(%): 97.06
GETTING_INTO_AND_OUT_OF_A_BATHTUB: NO DIFFICULTY AT ALL
JUMPING: NO DIFFICULTY AT ALL
HEAVY_WORK: NO DIFFICULTY AT ALL
HOW_WOULD_YOU_RATE_YOUR_CURRENT_LEVEL_OF_FUNCTION_DURING_YOUR_USUAL_ACTIVITIES_OF_DAILY_LIVING_FROM_0_TO_100_WITH_100_BEING_YOUR_LEVEL_OF_FUNCTION_PRIOR_TO_YOUR_HIP_PROBLEM_AND_0_BEING_THE_INABILITY_TO_PERFORM_ANY_OF_YOUR_USUAL_DAILY_ACTIVITIES?: 80
SEX_LIFE_(IF_APPLICABLE): MY SEX LIFE IS NORMAL AND CAUSES NO ADDITIONAL PAIN.
STANDING_FOR_15_MINUTES: NO DIFFICULTY AT ALL
PUTTING ON SOCKS AND SHOES: SLIGHT DIFFICULTY
WALKING_INITIALLY: NO DIFFICULTY AT ALL
LIFTING: I CAN LIFT HEAVY WEIGHTS WITHOUT ADDITIONAL PAIN.
ROLLING OVER IN BED: NO DIFFICULTY AT ALL
PLEASE_INDICATE_YOR_PRIMARY_REASON_FOR_REFERRAL_TO_THERAPY:: LOWER BACK, MID BACK, AND/OR SACRUM
STANDING: I CAN STAND AS LONG AS I WANT WITHOUT ADDITIONAL PAIN.
RECREATIONAL_ACTIVITIES: SLIGHT DIFFICULTY
RUNNING_ONE_MILE: NO DIFFICULTY AT ALL
TRAVELING: I CAN TRAVEL ANYWHERE WITHOUT PAIN.
STANDING FOR 15 MINUTES: NO DIFFICULTY AT ALL
WALKING_UP_STEEP_HILLS: NO DIFFICULTY AT ALL
GOING UP 1 FLIGHT OF STAIRS: NO DIFFICULTY AT ALL
IN_GENERAL_I_HAVE_NOT_ENJOYED_ALL_THE_THINGS_I_USED_TO_ENJOY: DISAGREE
ABILITY_TO_PERFORM_ACTIVITY_WITH_YOUR_NORMAL_TECHNIQUE: NO DIFFICULTY AT ALL
HEAVY_WORK: NO DIFFICULTY AT ALL
WALKING_15_MINUTES_OR_GREATER: NO DIFFICULTY AT ALL
RECREATIONAL ACTIVITIES: SLIGHT DIFFICULTY
HOW_WOULD_YOU_RATE_YOUR_CURRENT_LEVEL_OF_FUNCTION_DURING_YOUR_SPORTS_RELATED_ACTIVITIES_FROM_0_TO_100_WITH_100_BEING_YOUR_LEVEL_OF_FUNCTION_PRIOR_TO_YOUR_HIP_PROBLEM_AND_0_BEING_THE_INABILITY_TO_PERFORM_ANY_OF_YOUR_USUAL_DAILY_ACTIVITIES?: 80
GETTING_INTO_AND_OUT_OF_AN_AVERAGE_CAR: SLIGHT DIFFICULTY
HOW_WOULD_YOU_RATE_YOUR_CURRENT_LEVEL_OF_FUNCTION?: NEARLY NORMAL
TWISTING/PIVOTING ON INVOLVED LEG: NO DIFFICULTY AT ALL
PLEASE_INDICATE_YOR_PRIMARY_REASON_FOR_REFERRAL_TO_THERAPY:: HIP
ADL_SCORE(%): 0
WALKING_INITIALLY: NO DIFFICULTY AT ALL
WORRYING_THOUGHTS_HAVE_BEEN_GOING_THROUGH_MY_MIND_A_LOT_OF_THE_TIME: DISAGREE
IN_THE_LAST_2_WEEKS_I_HAVE_DRESSED_MORE_SLOWLY_THAN_USUAL_BECAUSE_OF_BACK_PAIN: DISAGREE
WALKING_DOWN_STEEP_HILLS: NO DIFFICULTY AT ALL

## 2024-07-12 ENCOUNTER — THERAPY VISIT (OUTPATIENT)
Dept: PHYSICAL THERAPY | Facility: CLINIC | Age: 34
End: 2024-07-12
Attending: NURSE PRACTITIONER
Payer: COMMERCIAL

## 2024-07-12 DIAGNOSIS — M25.551 HIP PAIN, RIGHT: ICD-10-CM

## 2024-07-12 PROCEDURE — 97161 PT EVAL LOW COMPLEX 20 MIN: CPT | Mod: GP | Performed by: PHYSICAL THERAPIST

## 2024-07-12 PROCEDURE — 97110 THERAPEUTIC EXERCISES: CPT | Mod: GP | Performed by: PHYSICAL THERAPIST

## 2024-07-12 PROCEDURE — 97140 MANUAL THERAPY 1/> REGIONS: CPT | Mod: GP | Performed by: PHYSICAL THERAPIST

## 2024-07-12 NOTE — PROGRESS NOTES
"PHYSICAL THERAPY EVALUATION  Type of Visit: Evaluation       Fall Risk Screen:  Fall screen completed by: PT  Have you fallen 2 or more times in the past year?: No  Have you fallen and had an injury in the past year?: No  Is patient a fall risk?: No    Subjective   KEY PT FINDINGS:  1) Decreased hip internal rotation on the Right  2) Mild decrease in rotation (ER + IR) strength  3) Lack of pelvifemoral control with SL squatting    Physical Therapy Initial Evaluation: Subjective History     Injury/Condition Details:  Presenting Complaint Right Hip \"Sensation\"    Onset Timing/Date June 2024 - taper weeks for [employer].    Mechanism \"I am not mobile\"   \"I am really tight\"   During the last couple of training weeks, having pain on the outside of the hip, groin and into the right hip.   Had had a 73 mile/week that week.   Still has the feeling in the outside of the hip but does not feel like it is effecting her walking as much as it did before.   It is a constant symptom/sensation. It has not in the last couple of weeks.      Symptom Behavior Details    Primary Symptoms Constant symptoms; worsen with activity, pain (Location: lateral hip, occasional groin, occasional posterior hip, Quality: Sharp and Aching/Throbbing), stiffness, catching/locking with a straddle position during intercourse.     [employer] classes (4x/week, including a power strength class)   [employer] whitney (strength for runners)   Just ordered a bike for cross    Foam rolling - has a foam roller, has a foam ball for her glute med    Worst Pain 5-6/10 (with see below)   Symptom Provocators Macdoel pose (feels it all around her hip)   Straddle position   Initial strides on the runs, it now warms up as she runs   Best Pain 1/10    Symptom Relievers Activity modification   Time of day dependent? No   Recent symptom change? symptoms improving     Prior Testing/Intervention for current condition:  Prior Tests None   Prior Treatment none " "    Lifestyle & General Medical History:  Employment [employer]    Usual physical activities  (within past year) See above > running, [employer] in December in [place]   Orthopaedic history See Epic Chart   Notable medical history See Epic Chart   Patient Reported Health good           Presenting condition or subjective complaint: Hip strength and mobility. I had serious \"sensations\" in my right hip during the final weeks of marathon training that had me not running as much as I'd like during the training block and/or not running my normal gate. I was not walking normal either.  Date of onset: 06/12/24    Relevant medical history: Depression   Dates & types of surgery:      Prior diagnostic imaging/testing results:       Prior therapy history for the same diagnosis, illness or injury: Yes PT via [facility]) for a few months at the start of marathon training (Spring 2024).    Living Environment  Social support: With a significant other or spouse   Type of home: House   Stairs to enter the home: Yes 10 Is there a railing: Yes     Ramp: No   Stairs inside the home: Yes 15 Is there a railing: Yes     Help at home: None  Equipment owned:       Employment: Yes [employer]   Hobbies/Interests: Running, weight training, hiking, pickle ball    Patient goals for therapy: Have more mobility and strength in my hips so I can continue to run faster and further for the rest of my life.       Objective     Hip Physical Therapy Examination    Dynamic Movement Screen:  2 leg stance: Equal weight bearing, normal appearing arch height.   2 leg squat: Good, maintains appropriate alignment    1 leg stance: Right: Normal; Left: Normal  1 leg squat:   Right: Proprio deficit, mild Add/IR of the thigh, no pain reported.   Left: Proprio deficit    Gait: Normal             Hip Joint ROM   Flex Ext ER IR ABD ADD   Right - deg - deg 55 deg 22 deg - deg - deg   Left - deg - deg 58 deg 40 deg - deg - " deg     Lower Extremity Muscle Strength (x/5)   Hip IR Hip ER Knee Ext Hip ABD Hip Ext Knee Flex   Right  4/5 4/5 5/5 4+/5 4+/5 4+/5   Left 4+/5 4+/5 5/5 4+/5 4+/5 4+/5     Basic Muscle Activation:  Transversus Abdominus: NEXT    Lower Extremity Flexibility Screen:  Hamstrings (Supine SLR): Right: -; Left: -  Gastroc (Supine Active DF): Right: NT; Left: NT  Quadriceps (Prone KF): Right: +; Left: +  Hip Flexors (Deshaun Test): Right: +; Left: +  ITB (Ga Test): Right: +; Left: +    Hip Special Testing:  Test Right Left   FADDIR (-) (-)   CHAYITO (+) (-)   Log Roll (ER) (-) (-)   Resisted SLR (-) (-)     Resisted Movement Testing:  Test Right Left   Adduction Squeeze (-) (-)   Psoas (-) (-)   TFL (-) (-)   ABD (-) (-)       Assessment & Plan   CLINICAL IMPRESSIONS  Medical Diagnosis: Right hip pain    Treatment Diagnosis: Right hip pain   Impression/Assessment: Patient is a 33 year old female with right hip complaints.  The following significant findings have been identified: Pain, Decreased ROM/flexibility, Decreased joint mobility, Decreased strength, Impaired muscle performance, and Decreased activity tolerance. These impairments interfere with their ability to perform recreational activities and community mobility as compared to previous level of function.     Clinical Decision Making (Complexity):  Clinical Presentation: Stable/Uncomplicated  Clinical Presentation Rationale: based on medical and personal factors listed in PT evaluation  Clinical Decision Making (Complexity): Low complexity    PLAN OF CARE  Treatment Interventions:  Modalities: Dry Needling  Interventions: Manual Therapy, Neuromuscular Re-education, Therapeutic Activity, Therapeutic Exercise    Long Term Goals     PT Goal 1  Goal Identifier: Yoga class  Goal Description: Patient will report no pain and improved ROM with yoga classes and pigeon pose.  Rationale: to maximize safety and independence within the community  Target Date:  09/08/24      Frequency of Treatment: 1x/week  Duration of Treatment: 8 weeks    Recommended Referrals to Other Professionals: Physical Therapy  Education Assessment:   Learner/Method: Patient;No Barriers to Learning    Risks and benefits of evaluation/treatment have been explained.   Patient/Family/caregiver agrees with Plan of Care.     Evaluation Time:     PT Eval, Low Complexity Minutes (09874): 17     Signing Clinician: Christel Javed PT

## 2024-07-19 ENCOUNTER — THERAPY VISIT (OUTPATIENT)
Dept: PHYSICAL THERAPY | Facility: CLINIC | Age: 34
End: 2024-07-19
Attending: NURSE PRACTITIONER
Payer: COMMERCIAL

## 2024-07-19 DIAGNOSIS — M25.551 HIP PAIN, RIGHT: Primary | ICD-10-CM

## 2024-07-19 PROCEDURE — 97140 MANUAL THERAPY 1/> REGIONS: CPT | Mod: GP | Performed by: PHYSICAL THERAPIST

## 2024-07-19 PROCEDURE — 97110 THERAPEUTIC EXERCISES: CPT | Mod: GP | Performed by: PHYSICAL THERAPIST

## 2024-07-29 ENCOUNTER — THERAPY VISIT (OUTPATIENT)
Dept: PHYSICAL THERAPY | Facility: CLINIC | Age: 34
End: 2024-07-29
Payer: COMMERCIAL

## 2024-07-29 DIAGNOSIS — M25.551 HIP PAIN, RIGHT: Primary | ICD-10-CM

## 2024-07-29 PROCEDURE — 97140 MANUAL THERAPY 1/> REGIONS: CPT | Mod: GP | Performed by: PHYSICAL THERAPIST

## 2024-07-29 PROCEDURE — 97110 THERAPEUTIC EXERCISES: CPT | Mod: GP | Performed by: PHYSICAL THERAPIST

## 2024-09-27 ENCOUNTER — THERAPY VISIT (OUTPATIENT)
Dept: PHYSICAL THERAPY | Facility: CLINIC | Age: 34
End: 2024-09-27
Payer: COMMERCIAL

## 2024-09-27 DIAGNOSIS — M25.551 HIP PAIN, RIGHT: Primary | ICD-10-CM

## 2024-09-27 PROCEDURE — 97110 THERAPEUTIC EXERCISES: CPT | Mod: GP | Performed by: PHYSICAL THERAPIST

## 2024-09-27 PROCEDURE — 97530 THERAPEUTIC ACTIVITIES: CPT | Mod: GP | Performed by: PHYSICAL THERAPIST

## 2024-09-27 PROCEDURE — 97140 MANUAL THERAPY 1/> REGIONS: CPT | Mod: GP | Performed by: PHYSICAL THERAPIST

## 2024-09-30 ENCOUNTER — THERAPY VISIT (OUTPATIENT)
Dept: PHYSICAL THERAPY | Facility: CLINIC | Age: 34
End: 2024-09-30
Payer: COMMERCIAL

## 2024-09-30 DIAGNOSIS — M25.551 HIP PAIN, RIGHT: Primary | ICD-10-CM

## 2024-09-30 PROCEDURE — 97140 MANUAL THERAPY 1/> REGIONS: CPT | Mod: GP | Performed by: PHYSICAL THERAPIST

## 2024-09-30 PROCEDURE — 97110 THERAPEUTIC EXERCISES: CPT | Mod: GP | Performed by: PHYSICAL THERAPIST

## 2024-10-15 ENCOUNTER — THERAPY VISIT (OUTPATIENT)
Dept: PHYSICAL THERAPY | Facility: CLINIC | Age: 34
End: 2024-10-15
Payer: COMMERCIAL

## 2024-10-15 DIAGNOSIS — M25.551 HIP PAIN, RIGHT: Primary | ICD-10-CM

## 2024-10-15 PROCEDURE — 97110 THERAPEUTIC EXERCISES: CPT | Mod: GP | Performed by: PHYSICAL THERAPIST

## 2024-10-15 PROCEDURE — 97140 MANUAL THERAPY 1/> REGIONS: CPT | Mod: GP | Performed by: PHYSICAL THERAPIST

## 2024-10-15 PROCEDURE — 97530 THERAPEUTIC ACTIVITIES: CPT | Mod: GP | Performed by: PHYSICAL THERAPIST

## 2024-11-04 ENCOUNTER — THERAPY VISIT (OUTPATIENT)
Dept: PHYSICAL THERAPY | Facility: CLINIC | Age: 34
End: 2024-11-04
Payer: COMMERCIAL

## 2024-11-04 DIAGNOSIS — M25.551 HIP PAIN, RIGHT: Primary | ICD-10-CM

## 2024-11-04 PROCEDURE — 97140 MANUAL THERAPY 1/> REGIONS: CPT | Mod: GP | Performed by: PHYSICAL THERAPIST

## 2024-11-04 PROCEDURE — 97110 THERAPEUTIC EXERCISES: CPT | Mod: GP | Performed by: PHYSICAL THERAPIST

## 2024-11-15 ENCOUNTER — THERAPY VISIT (OUTPATIENT)
Dept: PHYSICAL THERAPY | Facility: CLINIC | Age: 34
End: 2024-11-15
Payer: COMMERCIAL

## 2024-11-15 DIAGNOSIS — M25.551 HIP PAIN, RIGHT: Primary | ICD-10-CM

## 2024-11-15 PROCEDURE — 97140 MANUAL THERAPY 1/> REGIONS: CPT | Mod: GP | Performed by: PHYSICAL THERAPIST

## 2024-11-15 PROCEDURE — 97110 THERAPEUTIC EXERCISES: CPT | Mod: GP | Performed by: PHYSICAL THERAPIST

## 2024-12-31 ENCOUNTER — THERAPY VISIT (OUTPATIENT)
Dept: PHYSICAL THERAPY | Facility: CLINIC | Age: 34
End: 2024-12-31
Payer: COMMERCIAL

## 2024-12-31 DIAGNOSIS — M25.551 HIP PAIN, RIGHT: Primary | ICD-10-CM

## 2024-12-31 PROCEDURE — 97110 THERAPEUTIC EXERCISES: CPT | Mod: GP | Performed by: PHYSICAL THERAPIST

## 2025-03-18 ENCOUNTER — MYC REFILL (OUTPATIENT)
Dept: FAMILY MEDICINE | Facility: CLINIC | Age: 35
End: 2025-03-18
Payer: COMMERCIAL

## 2025-03-18 ENCOUNTER — MYC MEDICAL ADVICE (OUTPATIENT)
Dept: FAMILY MEDICINE | Facility: CLINIC | Age: 35
End: 2025-03-18
Payer: COMMERCIAL

## 2025-03-18 DIAGNOSIS — Z30.41 ENCOUNTER FOR SURVEILLANCE OF CONTRACEPTIVE PILLS: ICD-10-CM

## 2025-03-18 RX ORDER — NORETHINDRONE ACETATE AND ETHINYL ESTRADIOL .02; 1 MG/1; MG/1
1 TABLET ORAL DAILY
Qty: 84 TABLET | Refills: 0 | Status: SHIPPED | OUTPATIENT
Start: 2025-03-18

## 2025-04-04 SDOH — HEALTH STABILITY: PHYSICAL HEALTH: ON AVERAGE, HOW MANY DAYS PER WEEK DO YOU ENGAGE IN MODERATE TO STRENUOUS EXERCISE (LIKE A BRISK WALK)?: 7 DAYS

## 2025-04-04 SDOH — HEALTH STABILITY: PHYSICAL HEALTH: ON AVERAGE, HOW MANY MINUTES DO YOU ENGAGE IN EXERCISE AT THIS LEVEL?: 60 MIN

## 2025-04-04 ASSESSMENT — SOCIAL DETERMINANTS OF HEALTH (SDOH): HOW OFTEN DO YOU GET TOGETHER WITH FRIENDS OR RELATIVES?: ONCE A WEEK

## 2025-04-07 ENCOUNTER — OFFICE VISIT (OUTPATIENT)
Dept: FAMILY MEDICINE | Facility: CLINIC | Age: 35
End: 2025-04-07
Attending: NURSE PRACTITIONER
Payer: COMMERCIAL

## 2025-04-07 VITALS
OXYGEN SATURATION: 100 % | SYSTOLIC BLOOD PRESSURE: 112 MMHG | WEIGHT: 144.7 LBS | TEMPERATURE: 97.4 F | HEIGHT: 67 IN | BODY MASS INDEX: 22.71 KG/M2 | DIASTOLIC BLOOD PRESSURE: 70 MMHG | RESPIRATION RATE: 20 BRPM | HEART RATE: 69 BPM

## 2025-04-07 DIAGNOSIS — Z00.00 ROUTINE GENERAL MEDICAL EXAMINATION AT A HEALTH CARE FACILITY: Primary | ICD-10-CM

## 2025-04-07 DIAGNOSIS — B00.1 COLD SORE: ICD-10-CM

## 2025-04-07 DIAGNOSIS — M76.62 ACHILLES TENDINITIS OF LEFT LOWER EXTREMITY: ICD-10-CM

## 2025-04-07 DIAGNOSIS — Z30.41 ENCOUNTER FOR SURVEILLANCE OF CONTRACEPTIVE PILLS: ICD-10-CM

## 2025-04-07 PROCEDURE — 3074F SYST BP LT 130 MM HG: CPT | Performed by: NURSE PRACTITIONER

## 2025-04-07 PROCEDURE — 1126F AMNT PAIN NOTED NONE PRSNT: CPT | Performed by: NURSE PRACTITIONER

## 2025-04-07 PROCEDURE — 99213 OFFICE O/P EST LOW 20 MIN: CPT | Mod: 25 | Performed by: NURSE PRACTITIONER

## 2025-04-07 PROCEDURE — 3078F DIAST BP <80 MM HG: CPT | Performed by: NURSE PRACTITIONER

## 2025-04-07 PROCEDURE — 99395 PREV VISIT EST AGE 18-39: CPT | Mod: 25 | Performed by: NURSE PRACTITIONER

## 2025-04-07 PROCEDURE — G2211 COMPLEX E/M VISIT ADD ON: HCPCS | Performed by: NURSE PRACTITIONER

## 2025-04-07 RX ORDER — NORETHINDRONE ACETATE AND ETHINYL ESTRADIOL .02; 1 MG/1; MG/1
1 TABLET ORAL DAILY
Qty: 84 TABLET | Refills: 4 | Status: SHIPPED | OUTPATIENT
Start: 2025-04-07

## 2025-04-07 RX ORDER — VALACYCLOVIR HYDROCHLORIDE 1 G/1
TABLET, FILM COATED ORAL
Qty: 28 TABLET | Refills: 12 | Status: SHIPPED | OUTPATIENT
Start: 2025-04-07

## 2025-04-07 ASSESSMENT — PATIENT HEALTH QUESTIONNAIRE - PHQ9
10. IF YOU CHECKED OFF ANY PROBLEMS, HOW DIFFICULT HAVE THESE PROBLEMS MADE IT FOR YOU TO DO YOUR WORK, TAKE CARE OF THINGS AT HOME, OR GET ALONG WITH OTHER PEOPLE: SOMEWHAT DIFFICULT
SUM OF ALL RESPONSES TO PHQ QUESTIONS 1-9: 4
SUM OF ALL RESPONSES TO PHQ QUESTIONS 1-9: 4

## 2025-04-07 ASSESSMENT — PAIN SCALES - GENERAL: PAINLEVEL_OUTOF10: NO PAIN (0)

## 2025-04-07 NOTE — PROGRESS NOTES
Preventive Care Visit  [facility]  Eliane Shaver, NP, Nurse Practitioner - Family  Apr 7, 2025      Assessment & Plan     (Z00.00) Routine general medical examination at a health care facility  (primary encounter diagnosis)  Comment:   Plan:     (M76.62) Achilles tendinitis of left lower extremity  Comment:   Plan: Physical Therapy  Referral        Will refer to PT.     (Z30.41) Encounter for surveillance of contraceptive pills  Comment: stable  Plan: norethindrone-ethinyl estradiol (MICROGESTIN         1/20) 1-20 MG-MCG tablet        The current medical regimen is effective;  continue present plan and medications.     (B00.1) Cold sore  Comment: stable  Plan: valACYclovir (VALTREX) 1000 mg tablet        The current medical regimen is effective;  continue present plan and medications.     The longitudinal plan of care for the diagnosis(es)/condition(s) as documented were addressed during this visit. Due to the added complexity in care, I will continue to support Christiane in the subsequent management and with ongoing continuity of care.            Counseling  Appropriate preventive services were addressed with this patient via screening, questionnaire, or discussion as appropriate for fall prevention, nutrition, physical activity, Tobacco-use cessation, social engagement, weight loss and cognition.  Checklist reviewing preventive services available has been given to the patient.  Reviewed patient's diet, addressing concerns and/or questions.   The patient was instructed to see the dentist every 6 months.   She is at risk for psychosocial distress and has been provided with information to reduce risk.           Herberth Grande is a 34 year old, presenting for the following:  Physical           HPI  She is training for a 100K marathon in the fall.  She has noticed that she has a lump and tenderness on her left heel.             Advance Care Planning  Patient does not have a  Health Care Directive: Discussed advance care planning with patient; information given to patient to review.      4/4/2025   General Health   How would you rate your overall physical health? Good   Feel stress (tense, anxious, or unable to sleep) To some extent   (!) STRESS CONCERN      4/4/2025   Nutrition   Three or more servings of calcium each day? Yes   Diet: Vegetarian/vegan   How many servings of fruit and vegetables per day? (!) 2-3   How many sweetened beverages each day? 0-1         4/4/2025   Exercise   Days per week of moderate/strenous exercise 7 days   Average minutes spent exercising at this level 60 min         4/4/2025   Social Factors   Frequency of gathering with friends or relatives Once a week   Worry food won't last until get money to buy more No   Food not last or not have enough money for food? No   Do you have housing? (Housing is defined as stable permanent housing and does not include staying ouside in a car, in a tent, in an abandoned building, in an overnight shelter, or couch-surfing.) No   Are you worried about losing your housing? No   Lack of transportation? No   Unable to get utilities (heat,electricity)? No   Want help with housing or utility concern? No   (!) HOUSING CONCERN PRESENT      4/4/2025   Dental   Dentist two times every year? (!) NO           3/27/2024   TB Screening   Were you born outside of the [place]? No         Today's PHQ-9 Score:       4/7/2025     3:44 PM   PHQ-9 SCORE   PHQ-9 Total Score MyChart 4 (Minimal depression)   PHQ-9 Total Score 4        Patient-reported         4/4/2025   Substance Use   Alcohol more than 3/day or more than 7/wk Not Applicable   Do you use any other substances recreationally? (!) CANNABIS PRODUCTS     Social History     Tobacco Use    Smoking status: Never     Passive exposure: Never    Smokeless tobacco: Never   Vaping Use    Vaping status: Never Used   Substance Use Topics    Alcohol use: Yes     Comment: occasionally. less than 5 a  "week     Drug use: Yes     Types: Marijuana           4/28/2023   LAST FHS-7 RESULTS   1st degree relative breast or ovarian cancer No   Any male have breast cancer --   Any ONE woman have BOTH breast AND ovarian cancer No   Any woman with breast cancer before 50yrs Yes   2 or more relatives with breast AND/OR ovarian cancer Yes   2 or more relatives with breast AND/OR bowel cancer No                4/4/2025   STI Screening   New sexual partner(s) since last STI/HIV test? No     History of abnormal Pap smear: No - age 30-64 HPV with reflex Pap every 5 years recommended        Latest Ref Rng & Units 4/12/2022     4:29 PM 5/9/2019     4:06 PM 11/15/2016    12:00 AM   PAP / HPV   PAP  Negative for Intraepithelial Lesion or Malignancy (NILM)      PAP (Historical)   NIL     HPV 16 DNA Negative Negative      HPV 18 DNA Negative Negative      Other HR HPV Negative Negative      PAP-ABSTRACT    See Scanned Document           This result is from an external source.           4/4/2025   Contraception/Family Planning   Questions about contraception or family planning No        Reviewed and updated as needed this visit by Provider                             Objective    Exam  /70 (BP Location: Right arm, Patient Position: Sitting, Cuff Size: Adult Regular)   Pulse 69   Temp 97.4  F (36.3  C) (Temporal)   Resp 20   Ht 1.695 m (5' 6.73\")   Wt 65.6 kg (144 lb 11.2 oz)   LMP  (LMP Unknown)   SpO2 100%   BMI 22.85 kg/m     Estimated body mass index is 22.85 kg/m  as calculated from the following:    Height as of this encounter: 1.695 m (5' 6.73\").    Weight as of this encounter: 65.6 kg (144 lb 11.2 oz).    Physical Exam  GENERAL: alert and no distress  EYES: Eyes grossly normal to inspection, PERRL and conjunctivae and sclerae normal  HENT: ear canals and TM's normal, nose and mouth without ulcers or lesions  NECK: no adenopathy, no asymmetry, masses, or scars  RESP: lungs clear to auscultation - no rales, rhonchi or " wheezes  BREAST: normal without masses, tenderness or nipple discharge and no palpable axillary masses or adenopathy  CV: regular rate and rhythm, normal S1 S2, no S3 or S4, no murmur, click or rub, no peripheral edema  ABDOMEN: soft, nontender, no hepatosplenomegaly, no masses and bowel sounds normal  MS: mildly tender nodule on the left medial achilles tendon  SKIN: no suspicious lesions or rashes  NEURO: Normal strength and tone, mentation intact and speech normal  PSYCH: mentation appears normal, affect normal/bright        Signed Electronically by: Eliane Shaver NP    Answers submitted by the patient for this visit:  Patient Health Questionnaire (Submitted on 4/7/2025)  If you checked off any problems, how difficult have these problems made it for you to do your work, take care of things at home, or get along with other people?: Somewhat difficult  PHQ9 TOTAL SCORE: 4

## 2025-04-07 NOTE — PATIENT INSTRUCTIONS
Patient Education   Preventive Care Advice   This is general advice given by our system to help you stay healthy. However, your care team may have specific advice just for you. Please talk to your care team about your preventive care needs.  Nutrition  Eat 5 or more servings of fruits and vegetables each day.  Try wheat bread, brown rice and whole grain pasta (instead of white bread, rice, and pasta).  Get enough calcium and vitamin D. Check the label on foods and aim for 100% of the RDA (recommended daily allowance).  Lifestyle  Exercise at least 150 minutes each week  (30 minutes a day, 5 days a week).  Do muscle strengthening activities 2 days a week. These help control your weight and prevent disease.  No smoking.  Wear sunscreen to prevent skin cancer.  Have a dental exam and cleaning every 6 months.  Yearly exams  See your health care team every year to talk about:  Any changes in your health.  Any medicines your care team has prescribed.  Preventive care, family planning, and ways to prevent chronic diseases.  Shots (vaccines)   HPV shots (up to age 26), if you've never had them before.  Hepatitis B shots (up to age 59), if you've never had them before.  COVID-19 shot: Get this shot when it's due.  Flu shot: Get a flu shot every year.  Tetanus shot: Get a tetanus shot every 10 years.  Pneumococcal, hepatitis A, and RSV shots: Ask your care team if you need these based on your risk.  Shingles shot (for age 50 and up)  General health tests  Diabetes screening:  Starting at age 35, Get screened for diabetes at least every 3 years.  If you are younger than age 35, ask your care team if you should be screened for diabetes.  Cholesterol test: At age 39, start having a cholesterol test every 5 years, or more often if advised.  Bone density scan (DEXA): At age 50, ask your care team if you should have this scan for osteoporosis (brittle bones).  Hepatitis C: Get tested at least once in your life.  STIs (sexually  transmitted infections)  Before age 24: Ask your care team if you should be screened for STIs.  After age 24: Get screened for STIs if you're at risk. You are at risk for STIs (including HIV) if:  You are sexually active with more than one person.  You don't use condoms every time.  You or a partner was diagnosed with a sexually transmitted infection.  If you are at risk for HIV, ask about PrEP medicine to prevent HIV.  Get tested for HIV at least once in your life, whether you are at risk for HIV or not.  Cancer screening tests  Cervical cancer screening: If you have a cervix, begin getting regular cervical cancer screening tests starting at age 21.  Breast cancer scan (mammogram): If you've ever had breasts, begin having regular mammograms starting at age 40. This is a scan to check for breast cancer.  Colon cancer screening: It is important to start screening for colon cancer at age 45.  Have a colonoscopy test every 10 years (or more often if you're at risk) Or, ask your provider about stool tests like a FIT test every year or Cologuard test every 3 years.  To learn more about your testing options, visit:   .  For help making a decision, visit:   https://bit.ly/ye83170.  Prostate cancer screening test: If you have a prostate, ask your care team if a prostate cancer screening test (PSA) at age 55 is right for you.  Lung cancer screening: If you are a current or former smoker ages 50 to 80, ask your care team if ongoing lung cancer screenings are right for you.  For informational purposes only. Not to replace the advice of your health care provider. Copyright   2023 [facility]. All rights reserved. Clinically reviewed by the [facility]. Labmeeting [id number] - REV 01/24.  Learning About Stress  What is stress?     Stress is your body's response to a hard situation. Your body can have a physical, emotional, or mental response. Stress is a fact of life for most people, and it  affects everyone differently. What causes stress for you may not be stressful for someone else.  A lot of things can cause stress. You may feel stress when you go on a job interview, take a test, or run a race. This kind of short-term stress is normal and even useful. It can help you if you need to work hard or react quickly. For example, stress can help you finish an important job on time.  Long-term stress is caused by ongoing stressful situations or events. Examples of long-term stress include long-term health problems, ongoing problems at work, or conflicts in your family. Long-term stress can harm your health.  How does stress affect your health?  When you are stressed, your body responds as though you are in danger. It makes hormones that speed up your heart, make you breathe faster, and give you a burst of energy. This is called the fight-or-flight stress response. If the stress is over quickly, your body goes back to normal and no harm is done.  But if stress happens too often or lasts too long, it can have bad effects. Long-term stress can make you more likely to get sick, and it can make symptoms of some diseases worse. If you tense up when you are stressed, you may develop neck, shoulder, or low back pain. Stress is linked to high blood pressure and heart disease.  Stress also harms your emotional health. It can make you mondragon, tense, or depressed. Your relationships may suffer, and you may not do well at work or school.  What can you do to manage stress?  You can try these things to help manage stress:   Do something active. Exercise or activity can help reduce stress. Walking is a great way to get started. Even everyday activities such as housecleaning or yard work can help.  Try yoga or samantha chi. These techniques combine exercise and meditation. You may need some training at first to learn them.  Do something you enjoy. For example, listen to music or go to a movie. Practice your hobby or do volunteer  "work.  Meditate. This can help you relax, because you are not worrying about what happened before or what may happen in the future.  Do guided imagery. Imagine yourself in any setting that helps you feel calm. You can use online videos, books, or a teacher to guide you.  Do breathing exercises. For example:  From a standing position, bend forward from the waist with your knees slightly bent. Let your arms dangle close to the floor.  Breathe in slowly and deeply as you return to a standing position. Roll up slowly and lift your head last.  Hold your breath for just a few seconds in the standing position.  Breathe out slowly and bend forward from the waist.  Let your feelings out. Talk, laugh, cry, and express anger when you need to. Talking with supportive friends or family, a counselor, or a julien leader about your feelings is a healthy way to relieve stress. Avoid discussing your feelings with people who make you feel worse.  Write. It may help to write about things that are bothering you. This helps you find out how much stress you feel and what is causing it. When you know this, you can find better ways to cope.  What can you do to prevent stress?  You might try some of these things to help prevent stress:  Manage your time. This helps you find time to do the things you want and need to do.  Get enough sleep. Your body recovers from the stresses of the day while you are sleeping.  Get support. Your family, friends, and community can make a difference in how you experience stress.  Limit your news feed. Avoid or limit time on social media or news that may make you feel stressed.  Do something active. Exercise or activity can help reduce stress. Walking is a great way to get started.  Where can you learn more?  Go to https://www.Gen[employer].net/patiented  Enter N032 in the search box to learn more about \"Learning About Stress.\"  Current as of: October 24, 2024  Content Version: 14.4 2024-2025 [employer].   Care instructions adapted under license by your healthcare professional. If you have questions about a medical condition or this instruction, always ask your healthcare professional. [employer] disclaims any warranty or liability for your use of this information.    Substance Use Disorder: Care Instructions  Overview     You can improve your life and health by stopping your use of alcohol or drugs. When you don't drink or use drugs, you may feel and sleep better. You may get along better with your family, friends, and coworkers. There are medicines and programs that can help with substance use disorder.  How can you care for yourself at home?  Here are some ways to help you stay sober and prevent relapse.  If you have been given medicine to help keep you sober or reduce your cravings, be sure to take it exactly as prescribed.  Talk to your doctor about programs that can help you stop using drugs or drinking alcohol.  Do not keep alcohol or drugs in your home.  Plan ahead. Think about what you'll say if other people ask you to drink or use drugs. Try not to spend time with people who drink or use drugs.  Use the time and money spent on drinking or drugs to do something that's important to you.  Preventing a relapse  Have a plan to deal with relapse. Learn to recognize changes in your thinking that lead you to drink or use drugs. Get help before you start to drink or use drugs again.  Try to stay away from situations, friends, or places that may lead you to drink or use drugs.  If you feel the need to drink alcohol or use drugs again, seek help right away. Call a trusted friend or family member. Some people get support from organizations such as Narcotics Anonymous or [employer] or from treatment facilities.  If you relapse, get help as soon as you can. Some people make a plan with another person that outlines what they want that person to do for them if they relapse. The plan usually includes  how to handle the relapse and who to notify in case of relapse.  Don't give up. Remember that a relapse doesn't mean that you have failed. Use the experience to learn the triggers that lead you to drink or use drugs. Then quit again. Recovery is a lifelong process. Many people have several relapses before they are able to quit for good.  Follow-up care is a key part of your treatment and safety. Be sure to make and go to all appointments, and call your doctor if you are having problems. It's also a good idea to know your test results and keep a list of the medicines you take.  When should you call for help?   Call 911  anytime you think you may need emergency care. For example, call if you or someone else:    Has overdosed or has withdrawal signs. Be sure to tell the emergency workers that you are or someone else is using or trying to quit using drugs. Overdose or withdrawal signs may include:  Losing consciousness.  Seizure.  Seeing or hearing things that aren't there (hallucinations).     Is thinking or talking about suicide or harming others.   Where to get help 24 hours a day, 7 days a week   If you or someone you know talks about suicide, self-harm, a mental health crisis, a substance use crisis, or any other kind of emotional distress, get help right away. You can:    Call the Suicide and Crisis Lifeline at 242.     Call 2-374-885-TALK (1-363.478.4585).     Text HOME to 891372 to access the Crisis Text Line.   Consider saving these numbers in your phone.  Go to [employer] for more information or to chat online.  Call your doctor now or seek immediate medical care if:    You are having withdrawal symptoms. These may include nausea or vomiting, sweating, shakiness, and anxiety.   Watch closely for changes in your health, and be sure to contact your doctor if:    You have a relapse.     You need more help or support to stop.   Where can you learn more?  Go to https://www.healthwise.net/patiented  Enter H573  "in the search box to learn more about \"Substance Use Disorder: Care Instructions.\"  Current as of: August 20, 2024  Content Version: 14.4 2024-2025 [employer].   Care instructions adapted under license by your healthcare professional. If you have questions about a medical condition or this instruction, always ask your healthcare professional. [employer] disclaims any warranty or liability for your use of this information.       "

## 2025-05-23 ASSESSMENT — ACTIVITIES OF DAILY LIVING (ADL)
PERFORMING_HEAVY_ACTIVITIES_AROUND_YOUR_HOME: NO DIFFICULTY
GOING_UP_OR_DOWN_10_STAIRS: A LITTLE BIT OF DIFFICULTY
STANDING_FOR_1_HOUR: NO DIFFICULTY
WALKING_BETWEEN_ROOMS: A LITTLE BIT OF DIFFICULTY
PLEASE_INDICATE_YOR_PRIMARY_REASON_FOR_REFERRAL_TO_THERAPY:: FOOT AND/OR ANKLE
LIFTING_AN_OBJECT,_LIKE_A_BAG_OF_GROCERIES_FROM_THE_FLOOR: NO DIFFICULTY
MAKING_SHARP_TURNS_WHILE_RUNNING_FAST: MODERATE DIFFICULTY
WALKING_A_MILE: A LITTLE BIT OF DIFFICULTY
SHOPPING: MODERATE DIFFICULTY
RUNNING_ON_UNEVEN_GROUND: MODERATE DIFFICULTY
GETTING_INTO_AND_OUT_OF_A_BATH: NO DIFFICULTY
RUNNING_ON_EVEN_GROUND: MODERATE DIFFICULTY
PUTTING_ON_YOUR_SHOES_OR_SOCKS: NO DIFFICULTY
ANY_OF_YOUR_USUAL_WORK,_HOUSEWORK_OR_SCHOOL_ACTIVITIES: NO DIFFICULTY
LEFS_RAW_SCORE: 0
LEFS_SCORE(%): 0
PERFORMING_LIGHT_ACTIVITIES_AROUND_YOUR_HOME: NO DIFFICULTY
SQUATTING: A LITTLE BIT OF DIFFICULTY
GETTING_INTO_OR_OUT_OF_A_CAR: A LITTLE BIT OF DIFFICULTY
WALKING_2_BLOCKS: A LITTLE BIT OF DIFFICULTY
SITTING_FOR_1_HOUR: NO DIFFICULTY
YOUR_USUAL_HOBBIES,_RECREATIONAL_OR_SPORTING_ACTIVITIES: MODERATE DIFFICULTY
ROLLING_OVER_IN_BED: NO DIFFICULTY

## 2025-05-27 ENCOUNTER — THERAPY VISIT (OUTPATIENT)
Dept: PHYSICAL THERAPY | Facility: CLINIC | Age: 35
End: 2025-05-27
Attending: NURSE PRACTITIONER
Payer: COMMERCIAL

## 2025-05-27 DIAGNOSIS — M76.62 ACHILLES TENDINITIS OF LEFT LOWER EXTREMITY: ICD-10-CM

## 2025-05-27 PROCEDURE — 97161 PT EVAL LOW COMPLEX 20 MIN: CPT | Mod: GP | Performed by: PHYSICAL THERAPIST

## 2025-05-27 PROCEDURE — 97140 MANUAL THERAPY 1/> REGIONS: CPT | Mod: GP | Performed by: PHYSICAL THERAPIST

## 2025-05-27 PROCEDURE — 97110 THERAPEUTIC EXERCISES: CPT | Mod: GP | Performed by: PHYSICAL THERAPIST

## 2025-05-27 NOTE — PROGRESS NOTES
"PHYSICAL THERAPY EVALUATION  Type of Visit: Evaluation       Fall Risk Screen:  Have you fallen 2 or more times in the past year?: No  Have you fallen and had an injury in the past year?: No  Is patient receiving Physical Therapy Services?: No    Subjective   KEY PT FINDINGS:  1) No limitations in ankle DF motion (knee straight, bent, toes up)  2) No pain with resisted testing   3) TTP through medial gastroc head, posterior tib    Physical Therapy Initial Evaluation: Subjective History     Injury/Condition Details:  Presenting Complaint Left calf pain   Onset Timing/Date 4/7/2025   Mechanism Ran a hard 10 and had a 54 mile-week and is having calf pain/tightness.   Has been stretching, theragun to the calves, massaging it whenever she thinks about.     Has a bump on the back of her achilles that feels like a bruise all the time. It feels \"intense\" for the first mile. If she doesn't touch it, it's generally tight. It has been there since December.     10% build up this training cycle.   1 work out per week with faster than MP pace.   Mostly easy runs, occasional tempo run.   M: run, T: O+A, W: run, Th: O+A, F: R and home weights, Sat/Sun: 1 long run, 1 short easy on the other day.     Race: [employer] [employer] - [employer]      Symptom Behavior Details    Primary Symptoms Constant symptoms; worsen with activity, pain (Location: medial calf, mid-area, has a bump on the medial achilles. , Quality: Sharp and Aching/Throbbing), stiffness, catching/locking   Worst Pain 6-7/10 (with see below)   Symptom Provocators Stairs: don't feel easy.   Walking: feels ok in the afternoon, mornings are not great. Can feel stuck   Running: sore first mile - hurts pushing off the big toe.    Best Pain 0/10    Symptom Relievers Not running right now   Time of day dependent? No   Recent symptom change? no change in symptoms     Prior Testing/Intervention for current condition:  Prior Tests None   Prior Treatment PT      Lifestyle & General Medical " History: see previous note        Presenting condition or subjective complaint: calf and Achilles  Date of onset: 04/07/25    Relevant medical history:     Dates & types of surgery:      Prior diagnostic imaging/testing results:       Prior therapy history for the same diagnosis, illness or injury: Yes      Living Environment  Social support: With a significant other or spouse   Type of home: House   Stairs to enter the home: Yes 10 Is there a railing: Yes     Ramp: No   Stairs inside the home: Yes 15 Is there a railing: Yes     Help at home: None  Equipment owned:       Employment: Yes    Hobbies/Interests: running, hiking    Patient goals for therapy: run with ease       Objective   Foot/Ankle Physical Therapy Examination    Dynamic Movement Screen:  2 leg stance: symmetrical arch height, equal arch height.   2 leg squat: mild guarding through ankle DF with DL squat    1 leg stance: Right: Normal; Left: Normal  1 leg squat:   Right: NT  Left: NT    Gait: Guarded, lacking push-off on the left calf.     Range of motion:      Left (degrees) Right (degrees)   Knee Straight 15 13   Knee Bent 6cm 6cm   Toes Extended/Knee Bent Touch knee to wall Touch knee to all    Goniometer All other motions WNL All other motions WNL     Palpation:     Tender to palpation at the following structures: Medial gastroc head, medial achilles distal attachment    Resisted Testing:     Right Left   Plantarflexion (Toe Raises) - -   Dorsiflexion - -   Eversion - -   Inversion - -   Great Toe Extension - -   Great Toe Flexion - -     Foot Screen:   Normal  Poor intrinsic activation     Assessment & Plan   CLINICAL IMPRESSIONS  Medical Diagnosis: Left calf, achilles pain    Treatment Diagnosis: Left calf, achilles pain   Impression/Assessment: Patient is a 34 year old female with left calf, achilles complaints.  The following significant findings have been identified: Pain, Decreased joint mobility, Decreased strength, Impaired balance,  Impaired gait, Impaired muscle performance, and Decreased activity tolerance. These impairments interfere with their ability to perform recreational activities and community mobility as compared to previous level of function.     Clinical Decision Making (Complexity):  Clinical Presentation: Stable/Uncomplicated  Clinical Presentation Rationale: based on medical and personal factors listed in PT evaluation  Clinical Decision Making (Complexity): Low complexity    PLAN OF CARE  Treatment Interventions:  Modalities: Dry Needling  Interventions: Gait Training, Manual Therapy, Neuromuscular Re-education, Therapeutic Activity, Therapeutic Exercise    Long Term Goals     PT Goal 1  Goal Identifier: Stairs  Goal Description: Patient will ascend and descend stairs without pain or restriction  Rationale: to maximize safety and independence within the community  Target Date: 08/19/25      Frequency of Treatment: 2x/month  Duration of Treatment: 3 months    Recommended Referrals to Other Professionals: Physical Therapy  Education Assessment:   Learner/Method: Patient;No Barriers to Learning    Risks and benefits of evaluation/treatment have been explained.   Patient/Family/caregiver agrees with Plan of Care.     Evaluation Time:     PT Eval, Low Complexity Minutes (00790): 17     Signing Clinician: Christel Javed PT

## 2025-06-10 ENCOUNTER — THERAPY VISIT (OUTPATIENT)
Dept: PHYSICAL THERAPY | Facility: CLINIC | Age: 35
End: 2025-06-10
Payer: COMMERCIAL

## 2025-06-10 DIAGNOSIS — M76.62 ACHILLES TENDINITIS OF LEFT LOWER EXTREMITY: Primary | ICD-10-CM

## 2025-06-10 PROCEDURE — 97140 MANUAL THERAPY 1/> REGIONS: CPT | Mod: GP | Performed by: PHYSICAL THERAPIST

## 2025-06-10 PROCEDURE — 97110 THERAPEUTIC EXERCISES: CPT | Mod: GP | Performed by: PHYSICAL THERAPIST
